# Patient Record
Sex: MALE | Race: WHITE | NOT HISPANIC OR LATINO | Employment: OTHER | URBAN - METROPOLITAN AREA
[De-identification: names, ages, dates, MRNs, and addresses within clinical notes are randomized per-mention and may not be internally consistent; named-entity substitution may affect disease eponyms.]

---

## 2020-07-22 ENCOUNTER — HOSPITAL ENCOUNTER (OUTPATIENT)
Dept: LAB | Facility: HOSPITAL | Age: 75
Discharge: HOME | End: 2020-07-22
Attending: PHYSICIAN ASSISTANT

## 2020-07-22 LAB
25(OH)D3 SERPL-MCNC: 46.1 NG/ML (ref 30–100)
ALBUMIN SERPL-MCNC: 4.1 G/DL (ref 3.5–5)
ALBUMIN/GLOB SERPL: 1.2 {RATIO} (ref 1.4–2.6)
ALP SERPL-CCNC: 74 U/L (ref 56–155)
ALT SERPL-CCNC: 15 U/L (ref 10–40)
ANION GAP SERPL CALC-SCNC: 21 MMOL/L (ref 8–19)
AST SERPL-CCNC: 17 U/L (ref 15–50)
BASOPHILS # BLD AUTO: 0.03 10*3/UL (ref 0–0.2)
BASOPHILS NFR BLD AUTO: 0.4 % (ref 0–3)
BILIRUB SERPL-MCNC: 0.48 MG/DL (ref 0.2–1.3)
BUN SERPL-MCNC: 19 MG/DL (ref 5–25)
BUN/CREAT SERPL: 20 {RATIO} (ref 6–20)
CALCIUM SERPL-MCNC: 9.7 MG/DL (ref 8.7–10.4)
CHLORIDE SERPL-SCNC: 103 MMOL/L (ref 99–111)
CHOLEST SERPL-MCNC: 186 MG/DL (ref 107–200)
CHOLEST/HDLC SERPL: 3.9 {RATIO} (ref 3–6)
CONV ABS IMM GRAN: 0.01 10*3/UL (ref 0–0.2)
CONV CO2: 23 MMOL/L (ref 22–32)
CONV IMMATURE GRAN: 0.1 % (ref 0–1.8)
CONV TOTAL PROTEIN: 7.5 G/DL (ref 6.3–8.2)
CREAT UR-MCNC: 0.97 MG/DL (ref 0.7–1.2)
DEPRECATED RDW RBC AUTO: 44.3 FL (ref 35.1–43.9)
EOSINOPHIL # BLD AUTO: 0.43 10*3/UL (ref 0–0.7)
EOSINOPHIL # BLD AUTO: 5.5 % (ref 0–7)
ERYTHROCYTE [DISTWIDTH] IN BLOOD BY AUTOMATED COUNT: 12.9 % (ref 11.6–14.4)
GFR SERPLBLD BASED ON 1.73 SQ M-ARVRAT: >60 ML/MIN/{1.73_M2}
GLOBULIN UR ELPH-MCNC: 3.4 G/DL (ref 2–3.5)
GLUCOSE SERPL-MCNC: 98 MG/DL (ref 70–99)
HCT VFR BLD AUTO: 50.3 % (ref 42–52)
HDLC SERPL-MCNC: 48 MG/DL (ref 40–60)
HGB BLD-MCNC: 16.6 G/DL (ref 14–18)
LDLC SERPL CALC-MCNC: 119 MG/DL (ref 70–100)
LYMPHOCYTES # BLD AUTO: 2.67 10*3/UL (ref 1–5)
LYMPHOCYTES NFR BLD AUTO: 34.3 % (ref 20–45)
MCH RBC QN AUTO: 30.9 PG (ref 27–31)
MCHC RBC AUTO-ENTMCNC: 33 G/DL (ref 33–37)
MCV RBC AUTO: 93.5 FL (ref 80–96)
MONOCYTES # BLD AUTO: 0.75 10*3/UL (ref 0.2–1.2)
MONOCYTES NFR BLD AUTO: 9.6 % (ref 3–10)
NEUTROPHILS # BLD AUTO: 3.9 10*3/UL (ref 2–8)
NEUTROPHILS NFR BLD AUTO: 50.1 % (ref 30–85)
NRBC CBCN: 0 % (ref 0–0.7)
OSMOLALITY SERPL CALC.SUM OF ELEC: 296 MOSM/KG (ref 273–304)
PLATELET # BLD AUTO: 201 10*3/UL (ref 130–400)
PMV BLD AUTO: 11.1 FL (ref 9.4–12.4)
POTASSIUM SERPL-SCNC: 4.7 MMOL/L (ref 3.5–5.3)
PSA SERPL-MCNC: 0.32 NG/ML (ref 0–4)
RBC # BLD AUTO: 5.38 10*6/UL (ref 4.7–6.1)
SODIUM SERPL-SCNC: 142 MMOL/L (ref 135–147)
T4 FREE SERPL-MCNC: 1.3 NG/DL (ref 0.9–1.8)
TRIGL SERPL-MCNC: 94 MG/DL (ref 40–150)
TSH SERPL-ACNC: 2.67 M[IU]/L (ref 0.27–4.2)
VLDLC SERPL-MCNC: 19 MG/DL (ref 5–37)
WBC # BLD AUTO: 7.79 10*3/UL (ref 4.8–10.8)

## 2023-08-10 ENCOUNTER — ANESTHESIA EVENT (OUTPATIENT)
Dept: PERIOP | Facility: HOSPITAL | Age: 78
DRG: 482 | End: 2023-08-10
Payer: MEDICARE

## 2023-08-10 ENCOUNTER — APPOINTMENT (OUTPATIENT)
Dept: GENERAL RADIOLOGY | Facility: HOSPITAL | Age: 78
DRG: 482 | End: 2023-08-10
Payer: MEDICARE

## 2023-08-10 ENCOUNTER — HOSPITAL ENCOUNTER (INPATIENT)
Facility: HOSPITAL | Age: 78
LOS: 3 days | Discharge: HOME HEALTH | DRG: 482 | End: 2023-08-13
Attending: EMERGENCY MEDICINE | Admitting: FAMILY MEDICINE
Payer: MEDICARE

## 2023-08-10 DIAGNOSIS — S72.144A CLOSED NONDISPLACED INTERTROCHANTERIC FRACTURE OF RIGHT FEMUR, INITIAL ENCOUNTER: Primary | ICD-10-CM

## 2023-08-10 DIAGNOSIS — Z74.09 IMPAIRED MOBILITY AND ADLS: ICD-10-CM

## 2023-08-10 DIAGNOSIS — Z78.9 IMPAIRED MOBILITY AND ADLS: ICD-10-CM

## 2023-08-10 DIAGNOSIS — S72.141A CLOSED 2-PART INTERTROCHANTERIC FRACTURE OF RIGHT FEMUR, INITIAL ENCOUNTER: ICD-10-CM

## 2023-08-10 DIAGNOSIS — M25.561 RIGHT KNEE PAIN, UNSPECIFIED CHRONICITY: ICD-10-CM

## 2023-08-10 DIAGNOSIS — S41.111A SKIN TEAR OF RIGHT UPPER ARM WITHOUT COMPLICATION, INITIAL ENCOUNTER: ICD-10-CM

## 2023-08-10 DIAGNOSIS — S72.001A CLOSED RIGHT HIP FRACTURE, INITIAL ENCOUNTER: ICD-10-CM

## 2023-08-10 DIAGNOSIS — R26.2 DIFFICULTY IN WALKING: ICD-10-CM

## 2023-08-10 LAB
ALBUMIN SERPL-MCNC: 4 G/DL (ref 3.5–5.2)
ALBUMIN/GLOB SERPL: 1.1 G/DL
ALP SERPL-CCNC: 81 U/L (ref 39–117)
ALT SERPL W P-5'-P-CCNC: 11 U/L (ref 1–41)
ANION GAP SERPL CALCULATED.3IONS-SCNC: 8.4 MMOL/L (ref 5–15)
APTT PPP: 29.1 SECONDS (ref 78–95.9)
AST SERPL-CCNC: 13 U/L (ref 1–40)
BASOPHILS # BLD AUTO: 0.03 10*3/MM3 (ref 0–0.2)
BASOPHILS NFR BLD AUTO: 0.4 % (ref 0–1.5)
BILIRUB SERPL-MCNC: 0.4 MG/DL (ref 0–1.2)
BUN SERPL-MCNC: 23 MG/DL (ref 8–23)
BUN/CREAT SERPL: 23 (ref 7–25)
CALCIUM SPEC-SCNC: 9.2 MG/DL (ref 8.6–10.5)
CHLORIDE SERPL-SCNC: 103 MMOL/L (ref 98–107)
CO2 SERPL-SCNC: 27.6 MMOL/L (ref 22–29)
CREAT SERPL-MCNC: 1 MG/DL (ref 0.76–1.27)
DEPRECATED RDW RBC AUTO: 41.7 FL (ref 37–54)
EGFRCR SERPLBLD CKD-EPI 2021: 77.5 ML/MIN/1.73
EOSINOPHIL # BLD AUTO: 0.25 10*3/MM3 (ref 0–0.4)
EOSINOPHIL NFR BLD AUTO: 2.9 % (ref 0.3–6.2)
ERYTHROCYTE [DISTWIDTH] IN BLOOD BY AUTOMATED COUNT: 12.5 % (ref 12.3–15.4)
GLOBULIN UR ELPH-MCNC: 3.5 GM/DL
GLUCOSE SERPL-MCNC: 87 MG/DL (ref 65–99)
HCT VFR BLD AUTO: 46 % (ref 37.5–51)
HGB BLD-MCNC: 15.7 G/DL (ref 13–17.7)
IMM GRANULOCYTES # BLD AUTO: 0.03 10*3/MM3 (ref 0–0.05)
IMM GRANULOCYTES NFR BLD AUTO: 0.4 % (ref 0–0.5)
LIPASE SERPL-CCNC: 26 U/L (ref 13–60)
LYMPHOCYTES # BLD AUTO: 2.19 10*3/MM3 (ref 0.7–3.1)
LYMPHOCYTES NFR BLD AUTO: 25.8 % (ref 19.6–45.3)
MCH RBC QN AUTO: 31.2 PG (ref 26.6–33)
MCHC RBC AUTO-ENTMCNC: 34.1 G/DL (ref 31.5–35.7)
MCV RBC AUTO: 91.3 FL (ref 79–97)
MONOCYTES # BLD AUTO: 0.78 10*3/MM3 (ref 0.1–0.9)
MONOCYTES NFR BLD AUTO: 9.2 % (ref 5–12)
NEUTROPHILS NFR BLD AUTO: 5.22 10*3/MM3 (ref 1.7–7)
NEUTROPHILS NFR BLD AUTO: 61.3 % (ref 42.7–76)
NRBC BLD AUTO-RTO: 0 /100 WBC (ref 0–0.2)
PLATELET # BLD AUTO: 223 10*3/MM3 (ref 140–450)
PMV BLD AUTO: 9.7 FL (ref 6–12)
POTASSIUM SERPL-SCNC: 4.2 MMOL/L (ref 3.5–5.2)
PROT SERPL-MCNC: 7.5 G/DL (ref 6–8.5)
QT INTERVAL: 401 MS
RBC # BLD AUTO: 5.04 10*6/MM3 (ref 4.14–5.8)
SODIUM SERPL-SCNC: 139 MMOL/L (ref 136–145)
WBC NRBC COR # BLD: 8.5 10*3/MM3 (ref 3.4–10.8)

## 2023-08-10 PROCEDURE — 99222 1ST HOSP IP/OBS MODERATE 55: CPT | Performed by: ORTHOPAEDIC SURGERY

## 2023-08-10 PROCEDURE — 71045 X-RAY EXAM CHEST 1 VIEW: CPT

## 2023-08-10 PROCEDURE — 85025 COMPLETE CBC W/AUTO DIFF WBC: CPT | Performed by: EMERGENCY MEDICINE

## 2023-08-10 PROCEDURE — 99222 1ST HOSP IP/OBS MODERATE 55: CPT | Performed by: FAMILY MEDICINE

## 2023-08-10 PROCEDURE — 73502 X-RAY EXAM HIP UNI 2-3 VIEWS: CPT

## 2023-08-10 PROCEDURE — 83690 ASSAY OF LIPASE: CPT | Performed by: EMERGENCY MEDICINE

## 2023-08-10 PROCEDURE — 80053 COMPREHEN METABOLIC PANEL: CPT | Performed by: EMERGENCY MEDICINE

## 2023-08-10 PROCEDURE — 93005 ELECTROCARDIOGRAM TRACING: CPT | Performed by: EMERGENCY MEDICINE

## 2023-08-10 PROCEDURE — 99285 EMERGENCY DEPT VISIT HI MDM: CPT

## 2023-08-10 PROCEDURE — 85730 THROMBOPLASTIN TIME PARTIAL: CPT | Performed by: EMERGENCY MEDICINE

## 2023-08-10 RX ORDER — BISACODYL 10 MG
10 SUPPOSITORY, RECTAL RECTAL DAILY PRN
Status: DISCONTINUED | OUTPATIENT
Start: 2023-08-10 | End: 2023-08-13 | Stop reason: HOSPADM

## 2023-08-10 RX ORDER — HYDROCODONE BITARTRATE AND ACETAMINOPHEN 5; 325 MG/1; MG/1
1 TABLET ORAL EVERY 6 HOURS PRN
Status: DISCONTINUED | OUTPATIENT
Start: 2023-08-10 | End: 2023-08-13 | Stop reason: HOSPADM

## 2023-08-10 RX ORDER — POLYETHYLENE GLYCOL 3350 17 G/17G
17 POWDER, FOR SOLUTION ORAL DAILY PRN
Status: DISCONTINUED | OUTPATIENT
Start: 2023-08-10 | End: 2023-08-13 | Stop reason: HOSPADM

## 2023-08-10 RX ORDER — CEFAZOLIN SODIUM 2 G/100ML
2000 INJECTION, SOLUTION INTRAVENOUS
Status: DISCONTINUED | OUTPATIENT
Start: 2023-08-11 | End: 2023-08-11 | Stop reason: HOSPADM

## 2023-08-10 RX ORDER — ACETAMINOPHEN 325 MG/1
650 TABLET ORAL EVERY 4 HOURS PRN
Status: DISCONTINUED | OUTPATIENT
Start: 2023-08-10 | End: 2023-08-13 | Stop reason: HOSPADM

## 2023-08-10 RX ORDER — SODIUM CHLORIDE 0.9 % (FLUSH) 0.9 %
10 SYRINGE (ML) INJECTION AS NEEDED
Status: DISCONTINUED | OUTPATIENT
Start: 2023-08-10 | End: 2023-08-13 | Stop reason: HOSPADM

## 2023-08-10 RX ORDER — BISACODYL 5 MG/1
5 TABLET, DELAYED RELEASE ORAL DAILY PRN
Status: DISCONTINUED | OUTPATIENT
Start: 2023-08-10 | End: 2023-08-13 | Stop reason: HOSPADM

## 2023-08-10 RX ORDER — SODIUM CHLORIDE 0.9 % (FLUSH) 0.9 %
10 SYRINGE (ML) INJECTION EVERY 12 HOURS SCHEDULED
Status: DISCONTINUED | OUTPATIENT
Start: 2023-08-10 | End: 2023-08-13 | Stop reason: HOSPADM

## 2023-08-10 RX ORDER — SODIUM CHLORIDE 9 MG/ML
40 INJECTION, SOLUTION INTRAVENOUS AS NEEDED
Status: DISCONTINUED | OUTPATIENT
Start: 2023-08-10 | End: 2023-08-13 | Stop reason: HOSPADM

## 2023-08-10 RX ORDER — MORPHINE SULFATE 2 MG/ML
2 INJECTION, SOLUTION INTRAMUSCULAR; INTRAVENOUS EVERY 6 HOURS PRN
Status: DISCONTINUED | OUTPATIENT
Start: 2023-08-10 | End: 2023-08-11

## 2023-08-10 RX ORDER — AMOXICILLIN 250 MG
2 CAPSULE ORAL 2 TIMES DAILY
Status: DISCONTINUED | OUTPATIENT
Start: 2023-08-10 | End: 2023-08-13 | Stop reason: HOSPADM

## 2023-08-10 RX ORDER — HYDROCODONE BITARTRATE AND ACETAMINOPHEN 10; 325 MG/1; MG/1
1 TABLET ORAL EVERY 6 HOURS PRN
Status: DISCONTINUED | OUTPATIENT
Start: 2023-08-10 | End: 2023-08-11

## 2023-08-10 RX ORDER — ONDANSETRON 2 MG/ML
4 INJECTION INTRAMUSCULAR; INTRAVENOUS EVERY 6 HOURS PRN
Status: DISCONTINUED | OUTPATIENT
Start: 2023-08-10 | End: 2023-08-13 | Stop reason: HOSPADM

## 2023-08-10 RX ORDER — ALUMINA, MAGNESIA, AND SIMETHICONE 2400; 2400; 240 MG/30ML; MG/30ML; MG/30ML
15 SUSPENSION ORAL EVERY 6 HOURS PRN
Status: DISCONTINUED | OUTPATIENT
Start: 2023-08-10 | End: 2023-08-13 | Stop reason: HOSPADM

## 2023-08-10 RX ADMIN — Medication 10 ML: at 20:46

## 2023-08-10 RX ADMIN — SENNOSIDES AND DOCUSATE SODIUM 2 TABLET: 50; 8.6 TABLET ORAL at 20:47

## 2023-08-10 NOTE — H&P
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  He fell off his tractor approximately 1 week ago.  Initially he was able to stand up and ambulate with some distal femur pain after the fall.  He had no loss of consciousness.  Mechanism of fall was mechanical, he tripped over a 4 x 4 next to his tractor, landing on his right side.  Pain progressively worsened, acute worsening focalized pain in his right lateral hip area, enough to provoke him to visit the emergency room for further evaluation.  In the emergency room he had imaging of his right pelvis which revealed acute nondisplaced intertrochanteric fracture of the proximal right femur.  Given this finding, orthopedics was consulted, hospitalist service was requested to admit and further manage.  The patient's only recalled surgery is left eye surgery.  He takes no medications, he lives an active lifestyle, he has no shortness of breath with exertional activity.  He is an active farmer, his chest x-ray showed no significant findings other than pleural calcifications and bibasilar scarring and/or atelectasis.  His EKG showed no acute ischemic changes.  His labs are essentially unremarkable.      Personal History     Past Medical History:  History reviewed. No pertinent past medical history.      Past Surgical History:  History reviewed. No pertinent surgical history.      Family History:   History reviewed. No pertinent family history.  No family history of HTN, Bleeding disorders, Strokes or heart disease    Social History:   Social History      Socioeconomic History    Marital status: Single   Tobacco Use    Smoking status: Every Day     Packs/day: 1.00     Years: 70.00     Pack years: 70.00     Types: Cigarettes    Smokeless tobacco: Current   Vaping Use    Vaping Use: Never used   Substance and Sexual Activity    Alcohol use: Yes     Comment: occasionally    Drug use: Never    Sexual activity: Defer         Home Medications:       Allergies:  No Known Allergies    Review of systems:  All systems reviewed and negative except for right hip.      Objective   Objective     Vitals:   Temp:  [98.2 øF (36.8 øC)] 98.2 øF (36.8 øC)  Heart Rate:  [92] 92  Resp:  [18] 18  BP: (130)/(88) 130/88    Physical Exam    Constitutional: Awake, alert, no acute distress   Eyes: Pupils equal, sclerae anicteric, no conjunctival injection   HENT: NCAT, mucous membranes moist   Neck: Supple, no thyromegaly, no lymphadenopathy, trachea midline   Respiratory: Diminished to auscultation bilaterally, nonlabored respirations    Cardiovascular: RRR, no rubs, or gallops, palpable pedal pulses bilaterally   Gastrointestinal: Positive bowel sounds, soft, nontender, nondistended   Musculoskeletal: No bilateral ankle edema, no clubbing or cyanosis to extremities, tenderness to the right hip area   Psychiatric: Appropriate affect, cooperative   Neurologic: Oriented x 3, strength symmetric in all extremities, not tested right leg, distal right extremity neurovascular intact, Cranial Nerves grossly intact to confrontation, speech clear   Skin: No rashes visible on exposed skin      Result Review    Result Review:  I have personally reviewed the results from the time of this admission to 8/10/2023 16:28 EDT and agree with these findings:  [x]  Laboratory LAB RESULTS:      Lab 08/10/23  1427   WBC 8.50   HEMOGLOBIN 15.7   HEMATOCRIT 46.0   PLATELETS 223   NEUTROS ABS 5.22   IMMATURE GRANS (ABS) 0.03   LYMPHS ABS 2.19   MONOS ABS 0.78   EOS ABS 0.25   MCV 91.3   APTT 29.1*         Lab  08/10/23  1427   SODIUM 139   POTASSIUM 4.2   CHLORIDE 103   CO2 27.6   ANION GAP 8.4   BUN 23   CREATININE 1.00   EGFR 77.5   GLUCOSE 87   CALCIUM 9.2         Lab 08/10/23  1427   TOTAL PROTEIN 7.5   ALBUMIN 4.0   GLOBULIN 3.5   ALT (SGPT) 11   AST (SGOT) 13   BILIRUBIN 0.4   ALK PHOS 81   LIPASE 26                     Brief Urine Lab Results       None          Microbiology Results (last 10 days)       ** No results found for the last 240 hours. **            []  Microbiology  [x]  Radiology XR Chest 1 View    Result Date: 8/10/2023  PROCEDURE: XR CHEST 1 VW  COMPARISON: [facility], CR, CHEST PA/AP & LAT 2V, 8/03/2016, 19:05.  INDICATIONS: PRE-OP FOR HIP FRACTURE TODAY - NO CHEST COMPLAINTS  FINDINGS:  Heart size and pulmonary vessels normal.  Pleural calcifications noted on the left.  Scarring or chronic atelectasis in the lung bases.  No acute pulmonary abnormality demonstrated         1. No acute cardiopulmonary disease  2. Pleural calcifications and bibasilar scarring or atelectasis       SPEEDY LOBO MD       Electronically Signed and Approved By: SPEEDY LOBO MD on 8/10/2023 at 14:33             XR Hip With or Without Pelvis 2 - 3 View Right    Result Date: 8/10/2023  PROCEDURE: XR HIP W OR WO PELVIS 2-3 VIEW RIGHT  COMPARISON: None  INDICATIONS: fall/injury with right hip pain and inability to bear weight on right leg  FINDINGS:  There is mild narrowing of the hip joint spaces bilaterally.  There is an acute intertrochanteric nondisplaced fracture of the right proximal femur.  Sacroiliac joints appear within normal limits.  No definite pelvic fracture.        1. Acute nondisplaced intertrochanteric fracture of the proximal right femur.      MEDHAT SCOTT MD       Electronically Signed and Approved By: MEDHAT SCOTT MD on 8/10/2023 at 13:29              [x]  EKG/Telemetry   []  Cardiology/Vascular   []  Pathology  [x]  Old records  []  Other:      Assessment & Plan   Assessment / Plan      Assessment/Plan:   Assessment:  Acute nondisplaced intertrochanteric fracture of the proximal right femur  Current tobacco smoker  Bibasilar scarring, pleural calcifications    Plan:  Labs and imaging reviewed  Admit to hospitalist service  Regular diet, n.p.o. after midnight for anticipated orthopedic surgery intervention  Dr. Colindres consulted, follow-up recommendations  Chest x-ray and EKG reviewed  Will start anticoagulation after surgery for DVT prophylaxis  Pain control with Norco 5/325 and 10/325 mg every 6 hours as needed for moderate-severe pain respectively  Morphine every 6 hours as needed 2 mg for severe breakthrough pain  Bowel regimen  PT/OT after surgery  A.m. labs  Full code  DVT prophylaxis with SCDs  Clinical course dictate further management  Discussed with nurse at the bedside  Discussed with the ER midlevel provider who requested hospitalization      DVT prophylaxis:  No DVT prophylaxis order currently exists.    CODE STATUS:    Level Of Support Discussed With: Patient  Code Status (Patient has no pulse and is not breathing): CPR (Attempt to Resuscitate)  Medical Interventions (Patient has pulse or is breathing): Full Support      Admission Status:  I believe this patient meets inpatient status.    Electronically signed by Mariam Cartagena MD, 08/10/23, 4:28 PM EDT.    Portions of this documentation were transcribed electronically from a voice recognition software.  I confirm all data accurately represents the service(s) I performed at today's visit.

## 2023-08-10 NOTE — ED PROVIDER NOTES
Emergency Department Encounter    Date seen: 8/10/2023  Time: 2:26 PM EDT    Room number: 225/1    Chief Complaint: hip pain     HPI    History of Present Illness:  Patient is a 78 y.o. year old male who presents to the emergency department for evaluation of right hip and upper leg pain.  Patient reports he was stepping off of his tractor 1 week and 1 day ago when he stepped on a 2 x 4 that he was using as a chalk behind his tire.  The 2 x 4 is reported to have slipped out from underneath him causing him to fall landing on his right side. Pt fell on concrete.  He has been using a walker to help ambulate since then, which he normally does not have to do. He states that last night he went to stand up and had an sudden sharp pain in right hip and upper leg. Since then he has not been able to bear weight to that extremity. He also sustained a skin tear to his right upper arm at time of fall. He denies striking his head or any LOC.     Independent Historian/Clinical History and Information was obtained by:   Patient    History is limited by: N/A      PCP: Provider, No Known        Past Medical History:     No Known Allergies  History reviewed. No pertinent past medical history.  History reviewed. No pertinent surgical history.  History reviewed. No pertinent family history.    Home Medications:  Prior to Admission medications    Medication Sig Start Date End Date Taking? Authorizing Provider   naproxen sodium (ALEVE) 220 MG tablet Take 220 mg by mouth 2 (Two) Times a Day As Needed.    Emergency, Nurse Vikki, RN        Social History:   Social History     Tobacco Use    Smoking status: Every Day     Packs/day: 1.00     Years: 70.00     Pack years: 70.00     Types: Cigarettes    Smokeless tobacco: Current   Vaping Use    Vaping Use: Never used   Substance Use Topics    Alcohol use: Yes     Comment: occasionally    Drug use: Never       All labs were reviewed and interpreted by me.  All X-rays impressions were independently  "interpreted by me.  EKG was interpreted by me.      Review of Systems:  Review of Systems   Constitutional:  Negative for chills and fever.   HENT:  Negative for congestion, ear pain and sore throat.    Eyes:  Negative for pain.   Respiratory:  Negative for cough, chest tightness and shortness of breath.    Cardiovascular:  Negative for chest pain.   Gastrointestinal:  Negative for abdominal pain, diarrhea, nausea and vomiting.   Genitourinary:  Negative for flank pain and hematuria.   Musculoskeletal:  Positive for arthralgias (Right hip and hamstring area pain). Negative for joint swelling.   Skin:  Positive for wound (Right upper arm). Negative for pallor.   Neurological:  Negative for seizures and headaches.   All other systems reviewed and are negative.     Physical Exam:  /88 (BP Location: Left arm, Patient Position: Sitting)   Pulse 92   Temp 98.2 øF (36.8 øC) (Oral)   Resp 18   Ht 190.5 cm (75\")   Wt 88 kg (194 lb)   SpO2 96%   BMI 24.25 kg/mý     Physical Exam  Vitals and nursing note reviewed.   Constitutional:       General: He is not in acute distress.     Appearance: Normal appearance. He is not ill-appearing or toxic-appearing.   HENT:      Head: Normocephalic and atraumatic.      Mouth/Throat:      Mouth: Mucous membranes are moist.   Eyes:      General: No scleral icterus.  Cardiovascular:      Rate and Rhythm: Normal rate and regular rhythm.      Pulses: Normal pulses.      Heart sounds: Normal heart sounds.   Pulmonary:      Effort: Pulmonary effort is normal. No respiratory distress.      Breath sounds: Normal breath sounds.   Abdominal:      General: Abdomen is flat. There is no distension.      Palpations: Abdomen is soft.      Tenderness: There is no abdominal tenderness.   Musculoskeletal:         General: Tenderness and signs of injury present. No swelling or deformity. Normal range of motion.      Cervical back: Normal range of motion and neck supple.      Comments: There is no " internal or external rotation appreciated in legs, lower extremities, are of equal length there is no shortening on exam   Skin:     General: Skin is warm and dry.      Capillary Refill: Capillary refill takes less than 2 seconds.   Neurological:      Mental Status: He is alert and oriented to person, place, and time. Mental status is at baseline.      Sensory: No sensory deficit.                Procedures:  Procedures      Medical Decision Making:      Comorbidities that affect care:    Smoking    External Notes reviewed:    Previous Clinic Note: Clinic note reviewed from 9/3/2021 where patient was seen at a local urgent care center for a corneal abrasion      The following orders were placed and all results were independently analyzed by me:  Orders Placed This Encounter   Procedures    XR Hip With or Without Pelvis 2 - 3 View Right    XR Chest 1 View    Comprehensive Metabolic Panel    aPTT    Lipase    CBC Auto Differential    NPO Diet NPO Type: Strict NPO    Code Status and Medical Interventions:    IP General Consult (Use specialty-specific consult if known)    IP General Consult (Use specialty-specific consult if known)    ECG 12 Lead Pre-Op / Pre-Procedure    Insert Peripheral IV    Inpatient Admission    CBC & Differential       Medications Given in the Emergency Department:  Medications   sodium chloride 0.9 % flush 10 mL (has no administration in time range)        ED Course:    ED Course as of 08/10/23 1634   Thu Aug 10, 2023   1515 Dr. Colindres consulted. He would like pt to be admitted to hospital and made NPO after midnight.  [MS]   1534 Order placed for hospitalist consult at this time for admission to hospital  [MS]   1539 Spoke with Dr. Bronson and pt is to be admitted to Hospital Via Dr. Cartagena [MS]   1545 Spoke with Dr. Cartagena who will be admitting pt [MS]   1629 EKG shows a sinus rhythm with no ST elevation or other indications of lethal or fatal dysrhythmias.   [MS]      ED Course User  Index  [MS] Kaitlin Contreras, APRN       Labs:    Lab Results (last 24 hours)       Procedure Component Value Units Date/Time    Comprehensive Metabolic Panel [[id number]] Collected: 08/10/23 1427    Specimen: Blood Updated: 08/10/23 1507     Glucose 87 mg/dL      BUN 23 mg/dL      Creatinine 1.00 mg/dL      Sodium 139 mmol/L      Potassium 4.2 mmol/L      Chloride 103 mmol/L      CO2 27.6 mmol/L      Calcium 9.2 mg/dL      Total Protein 7.5 g/dL      Albumin 4.0 g/dL      ALT (SGPT) 11 U/L      AST (SGOT) 13 U/L      Alkaline Phosphatase 81 U/L      Total Bilirubin 0.4 mg/dL      Globulin 3.5 gm/dL      A/G Ratio 1.1 g/dL      BUN/Creatinine Ratio 23.0     Anion Gap 8.4 mmol/L      eGFR 77.5 mL/min/1.73     Narrative:      GFR Normal >60  Chronic Kidney Disease <60  Kidney Failure <15    The GFR formula is only valid for adults with stable renal function between ages 18 and 70.    CBC & Differential [[id number]]  (Normal) Collected: 08/10/23 1427    Specimen: Blood Updated: 08/10/23 1444    Narrative:      The following orders were created for panel order CBC & Differential.  Procedure                               Abnormality         Status                     ---------                               -----------         ------                     CBC Auto Differential[[id number]]        Normal              Final result                 Please view results for these tests on the individual orders.    aPTT [[id number]]  (Abnormal) Collected: 08/10/23 1427    Specimen: Blood Updated: 08/10/23 1458     PTT 29.1 seconds     Lipase [[id number]]  (Normal) Collected: 08/10/23 1427    Specimen: Blood Updated: 08/10/23 1507     Lipase 26 U/L     CBC Auto Differential [[id number]]  (Normal) Collected: 08/10/23 1427    Specimen: Blood Updated: 08/10/23 1444     WBC 8.50 10*3/mm3      RBC 5.04 10*6/mm3      Hemoglobin 15.7 g/dL      Hematocrit 46.0 %      MCV 91.3 fL      MCH 31.2 pg      MCHC 34.1 g/dL      RDW 12.5 %       RDW-SD 41.7 fl      MPV 9.7 fL      Platelets 223 10*3/mm3      Neutrophil % 61.3 %      Lymphocyte % 25.8 %      Monocyte % 9.2 %      Eosinophil % 2.9 %      Basophil % 0.4 %      Immature Grans % 0.4 %      Neutrophils, Absolute 5.22 10*3/mm3      Lymphocytes, Absolute 2.19 10*3/mm3      Monocytes, Absolute 0.78 10*3/mm3      Eosinophils, Absolute 0.25 10*3/mm3      Basophils, Absolute 0.03 10*3/mm3      Immature Grans, Absolute 0.03 10*3/mm3      nRBC 0.0 /100 WBC              Imaging:    XR Chest 1 View    Result Date: 8/10/2023  PROCEDURE: XR CHEST 1 VW  COMPARISON: [facility], , CHEST PA/AP & LAT 2V, 8/03/2016, 19:05.  INDICATIONS: PRE-OP FOR HIP FRACTURE TODAY - NO CHEST COMPLAINTS  FINDINGS:  Heart size and pulmonary vessels normal.  Pleural calcifications noted on the left.  Scarring or chronic atelectasis in the lung bases.  No acute pulmonary abnormality demonstrated         1. No acute cardiopulmonary disease  2. Pleural calcifications and bibasilar scarring or atelectasis       SPEEDY LOBO MD       Electronically Signed and Approved By: SPEEDY LOBO MD on 8/10/2023 at 14:33             XR Hip With or Without Pelvis 2 - 3 View Right    Result Date: 8/10/2023  PROCEDURE: XR HIP W OR WO PELVIS 2-3 VIEW RIGHT  COMPARISON: None  INDICATIONS: fall/injury with right hip pain and inability to bear weight on right leg  FINDINGS:  There is mild narrowing of the hip joint spaces bilaterally.  There is an acute intertrochanteric nondisplaced fracture of the right proximal femur.  Sacroiliac joints appear within normal limits.  No definite pelvic fracture.        1. Acute nondisplaced intertrochanteric fracture of the proximal right femur.      MEDHAT SCOTT MD       Electronically Signed and Approved By: MEDHAT SCOTT MD on 8/10/2023 at 13:29                Differential Diagnosis and Discussion:    Extremity Pain: Differential diagnosis includes but is not limited to soft tissue sprain,  tendonitis, tendon injury, dislocation, fracture, deep vein thrombosis, arterial insufficiency, osteoarthritis, bursitis, and ligamentous damage.        Patient Care Considerations:    CT EXTREMITY: I considered ordering an extremity CT, however fracture was obvious on x-ray and no additional imaging are required at this time.      Consultants/Shared Management Plan:    Hospitalist: I have discussed the case with Dr. Cartagena who agrees to accept the patient for admission.  Consultant: I have discussed the case with on-call orthopedic surgeon, Dr. Colindres, who states he will consult on patient once admitted to the hospital and taken to the operating room tomorrow.    Social Determinants of Health:    Patient is independent, reliable, and has access to care.       Disposition and Care Coordination:    Admit:   Through independent evaluation of the patient's history, physical, and imperical data, the patient meets criteria for observation/admission to the hospital.    MDM  Number of Diagnoses or Management Options  Closed nondisplaced intertrochanteric fracture of right femur, initial encounter: new and requires workup  Right knee pain, unspecified chronicity: new and does not require workup  Skin tear of right upper arm without complication, initial encounter: new and does not require workup     Amount and/or Complexity of Data Reviewed  Clinical lab tests: reviewed and ordered  Tests in the radiology section of [facility]: reviewed and ordered  Tests in the medicine section of [facility]: reviewed  Review and summarize past medical records: yes (I have personally reviewed patient's previous medical encounters.)  Discuss the patient with other providers: yes (I discussed this patient with Dr. Colindres who will take patient to the OR tomorrow.  I then spoke with on-call hospitalist Dr. Bronson as well as Dr. Cartagena.  Patient will be admitted to the hospital by Dr. Cartagena)  Independent visualization of images, tracings,  or specimens: yes (EKG reviewed)    Risk of Complications, Morbidity, and/or Mortality  Presenting problems: high  Diagnostic procedures: moderate  Management options: high    Patient Progress  Patient progress: stable       Final diagnoses:   Closed nondisplaced intertrochanteric fracture of right femur, initial encounter   Right knee pain, unspecified chronicity   Skin tear of right upper arm without complication, initial encounter        ED Disposition       ED Disposition   Decision to Admit    Condition   --    Comment   Level of Care: Med/Surg [1]   Diagnosis: Closed right hip fracture, initial encounter [194956]   Admitting Physician: MELVIN CORDOBA [[id number]]   Attending Physician: MELVIN CORDOBA [[id number]]   Isolate for COVID?: No [0]   Certification: I Certify That Inpatient Hospital Services Are Medically Necessary For Greater Than 2 Midnights                 This medical record created using voice recognition software.                       Kaitlin Contreras, APRN  08/10/23 9520

## 2023-08-10 NOTE — PAYOR COMM NOTE
"PATIENT INFORMATION  Name:  Patricia Stovall  MRN#:     [id number]  :  1945         ADMISSION INFORMATION  CLASS: Inpatient   DOS:  8/10/2023        CURRENT ATTENDING PROVIDER INFORMATION  Name/NPI: Mariam Cartagena MD [[id number]]  Phone:  Phone: [phone]        RENDERING FACILITY  Name:  [facility]   NPI:  [id number]  TID:  [id number]  Address:      [address] [place] [address]  Phone  [phone]        CASE MANAGEMENT CONTACT INFORMATION  Phone:      [phone]  Fax:           [phone]          Patricia Stovall (78 y.o. Male)       Date of Birth   1945    Social Security Number       Address   [address] [place] [id number]    Home Phone   [phone]    MRN   [id number]       Nondenominational   Unknown    Marital Status   Single                            Admission Date   8/10/23    Admission Type   Emergency    Admitting Provider   Mariam Cartagena MD    Attending Provider   Mariam Cartagena MD    Department, Room/Bed   [address] TOTAL JOINT [place],        Discharge Date       Discharge Disposition       Discharge Destination                                 Attending Provider: Mariam Cartagena MD    Allergies: No Known Allergies    Isolation: None   Infection: None   Code Status: CPR    Ht: 190.5 cm (75\")   Wt: 88 kg (194 lb)    Admission Cmt: None   Principal Problem: Closed 2-part intertrochanteric fracture of proximal end of right femur [S72.141A]                   Active Insurance as of 8/10/2023       Primary Coverage       Payor Plan Insurance Group Employer/Plan Group    HUMANA MEDICARE REPLACEMENT HUMANA MEDICARE REPLACEMENT [id number]       Payor Plan Address Payor Plan Phone Number Payor Plan Fax Number Effective Dates    [address] [address] [phone]  2021 - None Entered    [facility] [id number]         Subscriber Name Subscriber Birth Date Member ID       PATRICIA STOVALL 1945 [id number]                     "   History & Physical        Mariam Cartagena MD at 08/10/23 1627           [facility]   HOSPITALIST HISTORY AND PHYSICAL  Date: 8/10/2023   Patient Name: Anshul Meyer  : 1945  MRN: [id number]  Primary Care Physician:  Provider, No Known  Date of admission: 8/10/2023    Subjective   Subjective     Chief complaint: Right hip pain    History of presenting illness:  78-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  He fell off his tractor approximately 1 week ago.  Initially he was able to stand up and ambulate with some distal femur pain after the fall.  He had no loss of consciousness.  Mechanism of fall was mechanical, he tripped over a 4 x 4 next to his tractor, landing on his right side.  Pain progressively worsened, acute worsening focalized pain in his right lateral hip area, enough to provoke him to visit the emergency room for further evaluation.  In the emergency room he had imaging of his right pelvis which revealed acute nondisplaced intertrochanteric fracture of the proximal right femur.  Given this finding, orthopedics was consulted, hospitalist service was requested to admit and further manage.  The patient's only recalled surgery is left eye surgery.  He takes no medications, he lives an active lifestyle, he has no shortness of breath with exertional activity.  He is an active farmer, his chest x-ray showed no significant findings other than pleural calcifications and bibasilar scarring and/or atelectasis.  His EKG showed no acute ischemic changes.  His labs are essentially unremarkable.      Personal History     Past Medical History:  History reviewed. No pertinent past medical history.      Past Surgical History:  History reviewed. No pertinent surgical history.      Family History:   History reviewed. No pertinent family history.  No family history of HTN, Bleeding disorders,  Strokes or heart disease    Social History:   Social History     Socioeconomic History    Marital status: Single   Tobacco Use    Smoking status: Every Day     Packs/day: 1.00     Years: 70.00     Pack years: 70.00     Types: Cigarettes    Smokeless tobacco: Current   Vaping Use    Vaping Use: Never used   Substance and Sexual Activity    Alcohol use: Yes     Comment: occasionally    Drug use: Never    Sexual activity: Defer         Home Medications:       Allergies:  No Known Allergies    Review of systems:  All systems reviewed and negative except for right hip.      Objective   Objective     Vitals:   Temp:  [98.2 øF (36.8 øC)] 98.2 øF (36.8 øC)  Heart Rate:  [92] 92  Resp:  [18] 18  BP: (130)/(88) 130/88    Physical Exam    Constitutional: Awake, alert, no acute distress   Eyes: Pupils equal, sclerae anicteric, no conjunctival injection   HENT: NCAT, mucous membranes moist   Neck: Supple, no thyromegaly, no lymphadenopathy, trachea midline   Respiratory: Diminished to auscultation bilaterally, nonlabored respirations    Cardiovascular: RRR, no rubs, or gallops, palpable pedal pulses bilaterally   Gastrointestinal: Positive bowel sounds, soft, nontender, nondistended   Musculoskeletal: No bilateral ankle edema, no clubbing or cyanosis to extremities, tenderness to the right hip area   Psychiatric: Appropriate affect, cooperative   Neurologic: Oriented x 3, strength symmetric in all extremities, not tested right leg, distal right extremity neurovascular intact, Cranial Nerves grossly intact to confrontation, speech clear   Skin: No rashes visible on exposed skin      Result Review    Result Review:  I have personally reviewed the results from the time of this admission to 8/10/2023 16:28 EDT and agree with these findings:  [x]  Laboratory LAB RESULTS:      Lab 08/10/23  1427   WBC 8.50   HEMOGLOBIN 15.7   HEMATOCRIT 46.0   PLATELETS 223   NEUTROS ABS 5.22   IMMATURE GRANS (ABS) 0.03   LYMPHS ABS 2.19   MONOS ABS  0.78   EOS ABS 0.25   MCV 91.3   APTT 29.1*         Lab 08/10/23  1427   SODIUM 139   POTASSIUM 4.2   CHLORIDE 103   CO2 27.6   ANION GAP 8.4   BUN 23   CREATININE 1.00   EGFR 77.5   GLUCOSE 87   CALCIUM 9.2         Lab 08/10/23  1427   TOTAL PROTEIN 7.5   ALBUMIN 4.0   GLOBULIN 3.5   ALT (SGPT) 11   AST (SGOT) 13   BILIRUBIN 0.4   ALK PHOS 81   LIPASE 26                     Brief Urine Lab Results       None          Microbiology Results (last 10 days)       ** No results found for the last 240 hours. **            []  Microbiology  [x]  Radiology XR Chest 1 View    Result Date: 8/10/2023  PROCEDURE: XR CHEST 1 VW  COMPARISON: [facility], , CHEST PA/AP & LAT 2V, 8/03/2016, 19:05.  INDICATIONS: PRE-OP FOR HIP FRACTURE TODAY - NO CHEST COMPLAINTS  FINDINGS:  Heart size and pulmonary vessels normal.  Pleural calcifications noted on the left.  Scarring or chronic atelectasis in the lung bases.  No acute pulmonary abnormality demonstrated         1. No acute cardiopulmonary disease  2. Pleural calcifications and bibasilar scarring or atelectasis       SPEEDY LOBO MD       Electronically Signed and Approved By: SPEEDY LOBO MD on 8/10/2023 at 14:33             XR Hip With or Without Pelvis 2 - 3 View Right    Result Date: 8/10/2023  PROCEDURE: XR HIP W OR WO PELVIS 2-3 VIEW RIGHT  COMPARISON: None  INDICATIONS: fall/injury with right hip pain and inability to bear weight on right leg  FINDINGS:  There is mild narrowing of the hip joint spaces bilaterally.  There is an acute intertrochanteric nondisplaced fracture of the right proximal femur.  Sacroiliac joints appear within normal limits.  No definite pelvic fracture.        1. Acute nondisplaced intertrochanteric fracture of the proximal right femur.      MEDHAT SCOTT MD       Electronically Signed and Approved By: MEDHAT SCOTT MD on 8/10/2023 at 13:29              [x]  EKG/Telemetry   []  Cardiology/Vascular   []  Pathology  [x]  Old  records  []  Other:      Assessment & Plan   Assessment / Plan     Assessment/Plan:   Assessment:  Acute nondisplaced intertrochanteric fracture of the proximal right femur  Current tobacco smoker  Bibasilar scarring, pleural calcifications    Plan:  Labs and imaging reviewed  Admit to hospitalist service  Regular diet, n.p.o. after midnight for anticipated orthopedic surgery intervention  Dr. Colindres consulted, follow-up recommendations  Chest x-ray and EKG reviewed  Will start anticoagulation after surgery for DVT prophylaxis  Pain control with Norco 5/325 and 10/325 mg every 6 hours as needed for moderate-severe pain respectively  Morphine every 6 hours as needed 2 mg for severe breakthrough pain  Bowel regimen  PT/OT after surgery  A.m. labs  Full code  DVT prophylaxis with SCDs  Clinical course dictate further management  Discussed with nurse at the bedside  Discussed with the ER midlevel provider who requested hospitalization      DVT prophylaxis:  No DVT prophylaxis order currently exists.    CODE STATUS:    Level Of Support Discussed With: Patient  Code Status (Patient has no pulse and is not breathing): CPR (Attempt to Resuscitate)  Medical Interventions (Patient has pulse or is breathing): Full Support      Admission Status:  I believe this patient meets inpatient status.    Electronically signed by Mariam Cartagena MD, 08/10/23, 4:28 PM EDT.    Portions of this documentation were transcribed electronically from a voice recognition software.  I confirm all data accurately represents the service(s) I performed at today's visit.           Electronically signed by Mariam Cartagena MD at 08/10/23 1630          Emergency Department Notes        Kaitlin Contreras APRN at 08/10/23 1426          Emergency Department Encounter    Date seen: 8/10/2023  Time: 2:26 PM EDT    Room number: 225/1    Chief Complaint: hip pain     HPI    History of Present Illness:  Patient is a 78 y.o. year old male who  presents to the emergency department for evaluation of right hip and upper leg pain.  Patient reports he was stepping off of his tractor 1 week and 1 day ago when he stepped on a 2 x 4 that he was using as a chalk behind his tire.  The 2 x 4 is reported to have slipped out from underneath him causing him to fall landing on his right side. Pt fell on concrete.  He has been using a walker to help ambulate since then, which he normally does not have to do. He states that last night he went to stand up and had an sudden sharp pain in right hip and upper leg. Since then he has not been able to bear weight to that extremity. He also sustained a skin tear to his right upper arm at time of fall. He denies striking his head or any LOC.     Independent Historian/Clinical History and Information was obtained by:   Patient    History is limited by: N/A      PCP: Provider, No Known        Past Medical History:     No Known Allergies  History reviewed. No pertinent past medical history.  History reviewed. No pertinent surgical history.  History reviewed. No pertinent family history.    Home Medications:  Prior to Admission medications    Medication Sig Start Date End Date Taking? Authorizing Provider   naproxen sodium (ALEVE) 220 MG tablet Take 220 mg by mouth 2 (Two) Times a Day As Needed.    Emergency, Nurse Vikki, RN        Social History:   Social History     Tobacco Use    Smoking status: Every Day     Packs/day: 1.00     Years: 70.00     Pack years: 70.00     Types: Cigarettes    Smokeless tobacco: Current   Vaping Use    Vaping Use: Never used   Substance Use Topics    Alcohol use: Yes     Comment: occasionally    Drug use: Never       All labs were reviewed and interpreted by me.  All X-rays impressions were independently interpreted by me.  EKG was interpreted by me.      Review of Systems:  Review of Systems   Constitutional:  Negative for chills and fever.   HENT:  Negative for congestion, ear pain and sore throat.   "  Eyes:  Negative for pain.   Respiratory:  Negative for cough, chest tightness and shortness of breath.    Cardiovascular:  Negative for chest pain.   Gastrointestinal:  Negative for abdominal pain, diarrhea, nausea and vomiting.   Genitourinary:  Negative for flank pain and hematuria.   Musculoskeletal:  Positive for arthralgias (Right hip and hamstring area pain). Negative for joint swelling.   Skin:  Positive for wound (Right upper arm). Negative for pallor.   Neurological:  Negative for seizures and headaches.   All other systems reviewed and are negative.     Physical Exam:  /88 (BP Location: Left arm, Patient Position: Sitting)   Pulse 92   Temp 98.2 øF (36.8 øC) (Oral)   Resp 18   Ht 190.5 cm (75\")   Wt 88 kg (194 lb)   SpO2 96%   BMI 24.25 kg/mý     Physical Exam  Vitals and nursing note reviewed.   Constitutional:       General: He is not in acute distress.     Appearance: Normal appearance. He is not ill-appearing or toxic-appearing.   HENT:      Head: Normocephalic and atraumatic.      Mouth/Throat:      Mouth: Mucous membranes are moist.   Eyes:      General: No scleral icterus.  Cardiovascular:      Rate and Rhythm: Normal rate and regular rhythm.      Pulses: Normal pulses.      Heart sounds: Normal heart sounds.   Pulmonary:      Effort: Pulmonary effort is normal. No respiratory distress.      Breath sounds: Normal breath sounds.   Abdominal:      General: Abdomen is flat. There is no distension.      Palpations: Abdomen is soft.      Tenderness: There is no abdominal tenderness.   Musculoskeletal:         General: Tenderness and signs of injury present. No swelling or deformity. Normal range of motion.      Cervical back: Normal range of motion and neck supple.      Comments: There is no internal or external rotation appreciated in legs, lower extremities, are of equal length there is no shortening on exam   Skin:     General: Skin is warm and dry.      Capillary Refill: Capillary " refill takes less than 2 seconds.   Neurological:      Mental Status: He is alert and oriented to person, place, and time. Mental status is at baseline.      Sensory: No sensory deficit.                Procedures:  Procedures      Medical Decision Making:      Comorbidities that affect care:    Smoking    External Notes reviewed:    Previous Clinic Note: Clinic note reviewed from 9/3/2021 where patient was seen at a local urgent care center for a corneal abrasion      The following orders were placed and all results were independently analyzed by me:  Orders Placed This Encounter   Procedures    XR Hip With or Without Pelvis 2 - 3 View Right    XR Chest 1 View    Comprehensive Metabolic Panel    aPTT    Lipase    CBC Auto Differential    NPO Diet NPO Type: Strict NPO    Code Status and Medical Interventions:    IP General Consult (Use specialty-specific consult if known)    IP General Consult (Use specialty-specific consult if known)    ECG 12 Lead Pre-Op / Pre-Procedure    Insert Peripheral IV    Inpatient Admission    CBC & Differential       Medications Given in the Emergency Department:  Medications   sodium chloride 0.9 % flush 10 mL (has no administration in time range)        ED Course:    ED Course as of 08/10/23 1634   Thu Aug 10, 2023   1515 Dr. Colindres consulted. He would like pt to be admitted to hospital and made NPO after midnight.  [MS]   1534 Order placed for hospitalist consult at this time for admission to hospital  [MS]   1539 Spoke with Dr. Bronson and pt is to be admitted to Hospital Via Dr. Cartagena [MS]   1545 Spoke with Dr. Cartagena who will be admitting pt [MS]   1629 EKG shows a sinus rhythm with no ST elevation or other indications of lethal or fatal dysrhythmias.   [MS]      ED Course User Index  [MS] Kaitlin Contreras APRN       Labs:    Lab Results (last 24 hours)       Procedure Component Value Units Date/Time    Comprehensive Metabolic Panel [[id number]] Collected: 08/10/23  1427    Specimen: Blood Updated: 08/10/23 1507     Glucose 87 mg/dL      BUN 23 mg/dL      Creatinine 1.00 mg/dL      Sodium 139 mmol/L      Potassium 4.2 mmol/L      Chloride 103 mmol/L      CO2 27.6 mmol/L      Calcium 9.2 mg/dL      Total Protein 7.5 g/dL      Albumin 4.0 g/dL      ALT (SGPT) 11 U/L      AST (SGOT) 13 U/L      Alkaline Phosphatase 81 U/L      Total Bilirubin 0.4 mg/dL      Globulin 3.5 gm/dL      A/G Ratio 1.1 g/dL      BUN/Creatinine Ratio 23.0     Anion Gap 8.4 mmol/L      eGFR 77.5 mL/min/1.73     Narrative:      GFR Normal >60  Chronic Kidney Disease <60  Kidney Failure <15    The GFR formula is only valid for adults with stable renal function between ages 18 and 70.    CBC & Differential [[id number]]  (Normal) Collected: 08/10/23 1427    Specimen: Blood Updated: 08/10/23 1444    Narrative:      The following orders were created for panel order CBC & Differential.  Procedure                               Abnormality         Status                     ---------                               -----------         ------                     CBC Auto Differential[[id number]]        Normal              Final result                 Please view results for these tests on the individual orders.    aPTT [[id number]]  (Abnormal) Collected: 08/10/23 1427    Specimen: Blood Updated: 08/10/23 1458     PTT 29.1 seconds     Lipase [[id number]]  (Normal) Collected: 08/10/23 1427    Specimen: Blood Updated: 08/10/23 1507     Lipase 26 U/L     CBC Auto Differential [[id number]]  (Normal) Collected: 08/10/23 1427    Specimen: Blood Updated: 08/10/23 1444     WBC 8.50 10*3/mm3      RBC 5.04 10*6/mm3      Hemoglobin 15.7 g/dL      Hematocrit 46.0 %      MCV 91.3 fL      MCH 31.2 pg      MCHC 34.1 g/dL      RDW 12.5 %      RDW-SD 41.7 fl      MPV 9.7 fL      Platelets 223 10*3/mm3      Neutrophil % 61.3 %      Lymphocyte % 25.8 %      Monocyte % 9.2 %      Eosinophil % 2.9 %      Basophil % 0.4 %      Immature Grans %  0.4 %      Neutrophils, Absolute 5.22 10*3/mm3      Lymphocytes, Absolute 2.19 10*3/mm3      Monocytes, Absolute 0.78 10*3/mm3      Eosinophils, Absolute 0.25 10*3/mm3      Basophils, Absolute 0.03 10*3/mm3      Immature Grans, Absolute 0.03 10*3/mm3      nRBC 0.0 /100 WBC              Imaging:    XR Chest 1 View    Result Date: 8/10/2023  PROCEDURE: XR CHEST 1 VW  COMPARISON: [facility], , CHEST PA/AP & LAT 2V, 8/03/2016, 19:05.  INDICATIONS: PRE-OP FOR HIP FRACTURE TODAY - NO CHEST COMPLAINTS  FINDINGS:  Heart size and pulmonary vessels normal.  Pleural calcifications noted on the left.  Scarring or chronic atelectasis in the lung bases.  No acute pulmonary abnormality demonstrated         1. No acute cardiopulmonary disease  2. Pleural calcifications and bibasilar scarring or atelectasis       SPEEDY LOBO MD       Electronically Signed and Approved By: SPEEDY LOBO MD on 8/10/2023 at 14:33             XR Hip With or Without Pelvis 2 - 3 View Right    Result Date: 8/10/2023  PROCEDURE: XR HIP W OR WO PELVIS 2-3 VIEW RIGHT  COMPARISON: None  INDICATIONS: fall/injury with right hip pain and inability to bear weight on right leg  FINDINGS:  There is mild narrowing of the hip joint spaces bilaterally.  There is an acute intertrochanteric nondisplaced fracture of the right proximal femur.  Sacroiliac joints appear within normal limits.  No definite pelvic fracture.        1. Acute nondisplaced intertrochanteric fracture of the proximal right femur.      MEDHAT SCOTT MD       Electronically Signed and Approved By: MEDHAT SCOTT MD on 8/10/2023 at 13:29                Differential Diagnosis and Discussion:    Extremity Pain: Differential diagnosis includes but is not limited to soft tissue sprain, tendonitis, tendon injury, dislocation, fracture, deep vein thrombosis, arterial insufficiency, osteoarthritis, bursitis, and ligamentous damage.        Patient Care Considerations:    CT EXTREMITY: I  considered ordering an extremity CT, however fracture was obvious on x-ray and no additional imaging are required at this time.      Consultants/Shared Management Plan:    Hospitalist: I have discussed the case with Dr. Cartagena who agrees to accept the patient for admission.  Consultant: I have discussed the case with on-call orthopedic surgeon, Dr. Colindres, who states he will consult on patient once admitted to the hospital and taken to the operating room tomorrow.    Social Determinants of Health:    Patient is independent, reliable, and has access to care.       Disposition and Care Coordination:    Admit:   Through independent evaluation of the patient's history, physical, and imperical data, the patient meets criteria for observation/admission to the hospital.    MDM  Number of Diagnoses or Management Options  Closed nondisplaced intertrochanteric fracture of right femur, initial encounter: new and requires workup  Right knee pain, unspecified chronicity: new and does not require workup  Skin tear of right upper arm without complication, initial encounter: new and does not require workup     Amount and/or Complexity of Data Reviewed  Clinical lab tests: reviewed and ordered  Tests in the radiology section of [facility]: reviewed and ordered  Tests in the medicine section of [facility]: reviewed  Review and summarize past medical records: yes (I have personally reviewed patient's previous medical encounters.)  Discuss the patient with other providers: yes (I discussed this patient with Dr. Colindres who will take patient to the OR tomorrow.  I then spoke with on-call hospitalist Dr. Bronson as well as Dr. Cartagena.  Patient will be admitted to the hospital by Dr. Cartagena)  Independent visualization of images, tracings, or specimens: yes (EKG reviewed)    Risk of Complications, Morbidity, and/or Mortality  Presenting problems: high  Diagnostic procedures: moderate  Management options: high    Patient  Progress  Patient progress: stable       Final diagnoses:   Closed nondisplaced intertrochanteric fracture of right femur, initial encounter   Right knee pain, unspecified chronicity   Skin tear of right upper arm without complication, initial encounter        ED Disposition       ED Disposition   Decision to Admit    Condition   --    Comment   Level of Care: Med/Surg [1]   Diagnosis: Closed right hip fracture, initial encounter [[id number]]   Admitting Physician: MELVIN CORDOBA [[id number]]   Attending Physician: MELVIN CORDOBA [[id number]]   Isolate for COVID?: No [0]   Certification: I Certify That Inpatient Hospital Services Are Medically Necessary For Greater Than 2 Midnights                 This medical record created using voice recognition software.                       Kaitlin Contreras APRN  08/10/23 1634      Electronically signed by Kaitlin Contreras APRN at 08/10/23 1634       Lab Results (last 24 hours)       Procedure Component Value Units Date/Time    Comprehensive Metabolic Panel [[id number]] Collected: 08/10/23 1427    Specimen: Blood Updated: 08/10/23 1507     Glucose 87 mg/dL      BUN 23 mg/dL      Creatinine 1.00 mg/dL      Sodium 139 mmol/L      Potassium 4.2 mmol/L      Chloride 103 mmol/L      CO2 27.6 mmol/L      Calcium 9.2 mg/dL      Total Protein 7.5 g/dL      Albumin 4.0 g/dL      ALT (SGPT) 11 U/L      AST (SGOT) 13 U/L      Alkaline Phosphatase 81 U/L      Total Bilirubin 0.4 mg/dL      Globulin 3.5 gm/dL      A/G Ratio 1.1 g/dL      BUN/Creatinine Ratio 23.0     Anion Gap 8.4 mmol/L      eGFR 77.5 mL/min/1.73     Narrative:      GFR Normal >60  Chronic Kidney Disease <60  Kidney Failure <15    The GFR formula is only valid for adults with stable renal function between ages 18 and 70.    Lipase [[id number]]  (Normal) Collected: 08/10/23 1427    Specimen: Blood Updated: 08/10/23 1507     Lipase 26 U/L     aPTT [[id number]]  (Abnormal) Collected: 08/10/23 1427    Specimen: Blood  Updated: 08/10/23 1458     PTT 29.1 seconds     CBC & Differential [[id number]]  (Normal) Collected: 08/10/23 1427    Specimen: Blood Updated: 08/10/23 1444    Narrative:      The following orders were created for panel order CBC & Differential.  Procedure                               Abnormality         Status                     ---------                               -----------         ------                     CBC Auto Differential[[id number]]        Normal              Final result                 Please view results for these tests on the individual orders.    CBC Auto Differential [[id number]]  (Normal) Collected: 08/10/23 1427    Specimen: Blood Updated: 08/10/23 1444     WBC 8.50 10*3/mm3      RBC 5.04 10*6/mm3      Hemoglobin 15.7 g/dL      Hematocrit 46.0 %      MCV 91.3 fL      MCH 31.2 pg      MCHC 34.1 g/dL      RDW 12.5 %      RDW-SD 41.7 fl      MPV 9.7 fL      Platelets 223 10*3/mm3      Neutrophil % 61.3 %      Lymphocyte % 25.8 %      Monocyte % 9.2 %      Eosinophil % 2.9 %      Basophil % 0.4 %      Immature Grans % 0.4 %      Neutrophils, Absolute 5.22 10*3/mm3      Lymphocytes, Absolute 2.19 10*3/mm3      Monocytes, Absolute 0.78 10*3/mm3      Eosinophils, Absolute 0.25 10*3/mm3      Basophils, Absolute 0.03 10*3/mm3      Immature Grans, Absolute 0.03 10*3/mm3      nRBC 0.0 /100 WBC           Imaging Results (Last 24 Hours)       Procedure Component Value Units Date/Time    XR Chest 1 View [[id number]] Collected: 08/10/23 1433     Updated: 08/10/23 1436    Narrative:      PROCEDURE: XR CHEST 1 VW     COMPARISON: [facility], CR, CHEST PA/AP & LAT 2V, 8/03/2016, 19:05.     INDICATIONS: PRE-OP FOR HIP FRACTURE TODAY - NO CHEST COMPLAINTS     FINDINGS:   Heart size and pulmonary vessels normal.  Pleural calcifications noted on the left.  Scarring or   chronic atelectasis in the lung bases.  No acute pulmonary abnormality demonstrated       Impression:            1. No acute  cardiopulmonary disease     2. Pleural calcifications and bibasilar scarring or atelectasis                   SPEEDY LOBO MD         Electronically Signed and Approved By: SPEEDY LOBO MD on 8/10/2023 at 14:33                     XR Hip With or Without Pelvis 2 - 3 View Right [[id number]] Collected: 08/10/23 1330     Updated: 08/10/23 1333    Narrative:      PROCEDURE: XR HIP W OR WO PELVIS 2-3 VIEW RIGHT     COMPARISON: None     INDICATIONS: fall/injury with right hip pain and inability to bear weight on right leg     FINDINGS:   There is mild narrowing of the hip joint spaces bilaterally.  There is an acute intertrochanteric   nondisplaced fracture of the right proximal femur.  Sacroiliac joints appear within normal limits.    No definite pelvic fracture.       Impression:         1. Acute nondisplaced intertrochanteric fracture of the proximal right femur.               MEDHAT SCOTT MD         Electronically Signed and Approved By: MEDHAT SCOTT MD on 8/10/2023 at 13:29                           Orders (active)        Start     Ordered    08/11/23 0600  ceFAZolin in dextrose (ANCEF) IVPB solution 2,000 mg  On Call to O.R.         08/10/23 1635    08/11/23 0600  Basic Metabolic Panel  Morning Draw         08/10/23 1635    08/11/23 0600  CBC & Differential  Morning Draw         08/10/23 1635    08/11/23 0600  Magnesium  Morning Draw         08/10/23 1635    08/11/23 0600  Phosphorus  Morning Draw         08/10/23 1635    08/11/23 0600  Hepatic Function Panel  Morning Draw         08/10/23 1635    08/11/23 0001  NPO Diet NPO Type: Strict NPO  Diet Effective Midnight         08/10/23 1523    08/11/23 0001  NPO Diet NPO Type: Strict NPO  Diet Effective Midnight         08/10/23 1635    08/10/23 2100  sodium chloride 0.9 % flush 10 mL  Every 12 Hours Scheduled         08/10/23 1635    08/10/23 2100  sennosides-docusate (PERICOLACE) 8.6-50 MG per tablet 2 tablet  2 Times Daily        See Hyperspace for full  Linked Orders Report.    08/10/23 1635    08/10/23 2000  Vital Signs  Every 4 Hours       08/10/23 1635    08/10/23 1800  Oral Care  2 Times Daily       08/10/23 1635    08/10/23 1700  Strict Intake & Output  Every Hour       08/10/23 1635    08/10/23 1647  Inpatient Case Management  Consult  Once        Provider:  (Not yet assigned)    08/10/23 1646    08/10/23 1641  Inpatient Consult to Advance Care Planning  Once        Provider:  (Not yet assigned)    08/10/23 1646    08/10/23 1636  Intake & Output  Every Shift       08/10/23 1635    08/10/23 1636  Weigh Patient  Once         08/10/23 1635    08/10/23 1636  Insert Peripheral IV  Once         08/10/23 1635    08/10/23 1636  Saline Lock & Maintain IV Access  Continuous         08/10/23 1635    08/10/23 1636  Maintain Sequential Compression Device  Continuous         08/10/23 1635    08/10/23 1636  Diet: Regular/House Diet; Texture: Regular Texture (IDDSI 7); Fluid Consistency: Thin (IDDSI 0)  Diet Effective Now         08/10/23 1635    08/10/23 1635  HYDROcodone-acetaminophen (NORCO) 5-325 MG per tablet 1 tablet  Every 6 Hours PRN         08/10/23 1635    08/10/23 1635  HYDROcodone-acetaminophen (NORCO)  MG per tablet 1 tablet  Every 6 Hours PRN         08/10/23 1635    08/10/23 1635  morphine injection 2 mg  Every 6 Hours PRN         08/10/23 1635    08/10/23 1635  sodium chloride 0.9 % flush 10 mL  As Needed         08/10/23 1635    08/10/23 1635  sodium chloride 0.9 % infusion 40 mL  As Needed         08/10/23 1635    08/10/23 1635  polyethylene glycol (MIRALAX) packet 17 g  Daily PRN        See Hyperspace for full Linked Orders Report.    08/10/23 1635    08/10/23 1635  bisacodyl (DULCOLAX) EC tablet 5 mg  Daily PRN        See Hyperspace for full Linked Orders Report.    08/10/23 1635    08/10/23 1635  bisacodyl (DULCOLAX) suppository 10 mg  Daily PRN        See Hyperspace for full Linked Orders Report.    08/10/23 1635    08/10/23 1631   acetaminophen (TYLENOL) tablet 650 mg  Every 4 Hours PRN         08/10/23 1635    08/10/23 1635  ondansetron (ZOFRAN) injection 4 mg  Every 6 Hours PRN         08/10/23 1635    08/10/23 1635  aluminum-magnesium hydroxide-simethicone (MAALOX MAX) 400-400-40 MG/5ML suspension 15 mL  Every 6 Hours PRN         08/10/23 1635    08/10/23 1548  Code Status and Medical Interventions:  Continuous         08/10/23 1549    08/10/23 1532  IP General Consult (Use specialty-specific consult if known)  Once        Provider:  (Not yet assigned)    08/10/23 1531    08/10/23 1510  IP General Consult (Use specialty-specific consult if known)  Once        Provider:  Elias Colindres MD    08/10/23 1509    08/10/23 1354  Insert Peripheral IV  Once        See Hyperspace for full Linked Orders Report.    08/10/23 1354    08/10/23 1353  sodium chloride 0.9 % flush 10 mL  As Needed        See Hyperspace for full Linked Orders Report.    08/10/23 1354    Unscheduled  Up With Assistance  As Needed       08/10/23 1635                     Consult Notes (last 24 hours)        Elias Colindres MD at 08/10/23 1725            [facility]   Consult Note    Patient Name: Anshul Meyer  : 1945  MRN: [id number]  Primary Care Physician:  Provider, No Known  Referring Physician: No ref. provider found  Date of admission: 8/10/2023    Subjective   Subjective     Reason for Consult/ Chief Complaint: Right hip fracture    HPI:  Anshul Meyer is a 78 y.o. male who had a mechanical fall about a week ago.  He was initially doing pretty well and thought he was getting better.  However yesterday he had acute increase in his right hip pain.  He was seen in the emergency department and x-rays revealed a right intertrochanteric proximal femur fracture.  The patient reports right hip pain.  He has pain with weightbearing and range of motion of the hip.  He is using a walker to ambulate.    Review of Systems   All systems were reviewed and negative  except for those mentioned in HPI    Personal History     History reviewed. No pertinent past medical history.    History reviewed. No pertinent surgical history.    Family History: family history is not on file. Otherwise pertinent FHx was reviewed and not pertinent to current issue.    Social History:  reports that he has been smoking cigarettes. He has a 70.00 pack-year smoking history. He uses smokeless tobacco. He reports current alcohol use. He reports that he does not use drugs.    Home Medications:       Allergies:  No Known Allergies    Objective    Objective     Vitals:   Temp:  [97.7 øF (36.5 øC)-98.2 øF (36.8 øC)] 97.7 øF (36.5 øC)  Heart Rate:  [80-92] 80  Resp:  [18-22] 22  BP: (130-149)/(68-88) 149/68    Physical Exam:   Constitutional: Awake, alert   Eyes: PERRLA, sclerae anicteric, no conjunctival injection   HENT: NCAT, mucous membranes moist   Neck: Supple,    Respiratory: Unlabored breathing   Cardiovascular: Regular heart rate   Gastrointestinal: Nontender and nondistended   Musculoskeletal: Tender to palpation right hip.  Pain with hip range of motion.  Positive pulses.  Pain with hip range of motion.  Pain with weightbearing.  Neurovascular intact extremity.   Psychiatric: Appropriate affect, cooperative   Neurologic: Oriented x 3, strength symmetric in all extremities, Cranial Nerves grossly intact to confrontation, speech clear   Skin: No rashes     Result Review    Result Review:  I have personally reviewed the results from the time of this admission to 8/10/2023 17:25 EDT and agree with these findings:  []  Laboratory  []  Microbiology  [x]  Radiology  []  EKG/Telemetry   []  Cardiology/Vascular   []  Pathology  []  Old records  []  Other:    Most notable findings include: Acute nondisplaced intertrochanteric proximal femur fracture    Assessment & Plan   Assessment / Plan     Brief Patient Summary:  Anshul Meyer is a 78 y.o. male who has nondisplaced right intertrochanteric proximal femur  fracture    Active Hospital Problems:  Active Hospital Problems    Diagnosis     **Closed 2-part intertrochanteric fracture of proximal end of right femur     Closed right hip fracture, initial encounter        Plan: I discussed treatment options with the patient.  Operative versus nonoperative treatment was discussed.  Risks and benefits of surgery were discussed.  Plan for n.p.o. after midnight and reduction and internal fixation of right proximal femur fracture tomorrow.  Thank you for the consultation.        Electronically signed by Elias Colindres MD, 08/10/23, 5:25 PM EDT.      Electronically signed by Elias Colindres MD at 08/10/23 8285

## 2023-08-10 NOTE — PLAN OF CARE
Goal Outcome Evaluation:            ER ADMIT, RIGHT FEMUR FRACTURE. SURGERY TOMORROW, NPO AT MIDNIGHT. NO COMPLAINTS.

## 2023-08-10 NOTE — H&P (VIEW-ONLY)
[facility]   Consult Note    Patient Name: Anshul Meyer  : 1945  MRN: [id number]  Primary Care Physician:  Provider, No Known  Referring Physician: No ref. provider found  Date of admission: 8/10/2023    Subjective   Subjective     Reason for Consult/ Chief Complaint: Right hip fracture    HPI:  Anshul Meyer is a 78 y.o. male who had a mechanical fall about a week ago.  He was initially doing pretty well and thought he was getting better.  However yesterday he had acute increase in his right hip pain.  He was seen in the emergency department and x-rays revealed a right intertrochanteric proximal femur fracture.  The patient reports right hip pain.  He has pain with weightbearing and range of motion of the hip.  He is using a walker to ambulate.    Review of Systems   All systems were reviewed and negative except for those mentioned in HPI    Personal History     History reviewed. No pertinent past medical history.    History reviewed. No pertinent surgical history.    Family History: family history is not on file. Otherwise pertinent FHx was reviewed and not pertinent to current issue.    Social History:  reports that he has been smoking cigarettes. He has a 70.00 pack-year smoking history. He uses smokeless tobacco. He reports current alcohol use. He reports that he does not use drugs.    Home Medications:       Allergies:  No Known Allergies    Objective    Objective     Vitals:   Temp:  [97.7 øF (36.5 øC)-98.2 øF (36.8 øC)] 97.7 øF (36.5 øC)  Heart Rate:  [80-92] 80  Resp:  [18-22] 22  BP: (130-149)/(68-88) 149/68    Physical Exam:   Constitutional: Awake, alert   Eyes: PERRLA, sclerae anicteric, no conjunctival injection   HENT: NCAT, mucous membranes moist   Neck: Supple,    Respiratory: Unlabored breathing   Cardiovascular: Regular heart rate   Gastrointestinal: Nontender and nondistended   Musculoskeletal: Tender to palpation right hip.  Pain with hip range of motion.  Positive pulses.  Pain  with hip range of motion.  Pain with weightbearing.  Neurovascular intact extremity.   Psychiatric: Appropriate affect, cooperative   Neurologic: Oriented x 3, strength symmetric in all extremities, Cranial Nerves grossly intact to confrontation, speech clear   Skin: No rashes     Result Review    Result Review:  I have personally reviewed the results from the time of this admission to 8/10/2023 17:25 EDT and agree with these findings:  []  Laboratory  []  Microbiology  [x]  Radiology  []  EKG/Telemetry   []  Cardiology/Vascular   []  Pathology  []  Old records  []  Other:    Most notable findings include: Acute nondisplaced intertrochanteric proximal femur fracture    Assessment & Plan   Assessment / Plan     Brief Patient Summary:  Anshul Meyer is a 78 y.o. male who has nondisplaced right intertrochanteric proximal femur fracture    Active Hospital Problems:  Active Hospital Problems    Diagnosis     **Closed 2-part intertrochanteric fracture of proximal end of right femur     Closed right hip fracture, initial encounter        Plan: I discussed treatment options with the patient.  Operative versus nonoperative treatment was discussed.  Risks and benefits of surgery were discussed.  Plan for n.p.o. after midnight and reduction and internal fixation of right proximal femur fracture tomorrow.  Thank you for the consultation.        Electronically signed by Elias Colindres MD, 08/10/23, 5:25 PM EDT.

## 2023-08-10 NOTE — CONSULTS
[facility]   Consult Note    Patient Name: Anshul Meyer  : 1945  MRN: [id number]  Primary Care Physician:  Provider, No Known  Referring Physician: No ref. provider found  Date of admission: 8/10/2023    Subjective   Subjective     Reason for Consult/ Chief Complaint: Right hip fracture    HPI:  Anshul Meyer is a 78 y.o. male who had a mechanical fall about a week ago.  He was initially doing pretty well and thought he was getting better.  However yesterday he had acute increase in his right hip pain.  He was seen in the emergency department and x-rays revealed a right intertrochanteric proximal femur fracture.  The patient reports right hip pain.  He has pain with weightbearing and range of motion of the hip.  He is using a walker to ambulate.    Review of Systems   All systems were reviewed and negative except for those mentioned in HPI    Personal History     History reviewed. No pertinent past medical history.    History reviewed. No pertinent surgical history.    Family History: family history is not on file. Otherwise pertinent FHx was reviewed and not pertinent to current issue.    Social History:  reports that he has been smoking cigarettes. He has a 70.00 pack-year smoking history. He uses smokeless tobacco. He reports current alcohol use. He reports that he does not use drugs.    Home Medications:       Allergies:  No Known Allergies    Objective    Objective     Vitals:   Temp:  [97.7 øF (36.5 øC)-98.2 øF (36.8 øC)] 97.7 øF (36.5 øC)  Heart Rate:  [80-92] 80  Resp:  [18-22] 22  BP: (130-149)/(68-88) 149/68    Physical Exam:   Constitutional: Awake, alert   Eyes: PERRLA, sclerae anicteric, no conjunctival injection   HENT: NCAT, mucous membranes moist   Neck: Supple,    Respiratory: Unlabored breathing   Cardiovascular: Regular heart rate   Gastrointestinal: Nontender and nondistended   Musculoskeletal: Tender to palpation right hip.  Pain with hip range of motion.  Positive pulses.  Pain  with hip range of motion.  Pain with weightbearing.  Neurovascular intact extremity.   Psychiatric: Appropriate affect, cooperative   Neurologic: Oriented x 3, strength symmetric in all extremities, Cranial Nerves grossly intact to confrontation, speech clear   Skin: No rashes     Result Review    Result Review:  I have personally reviewed the results from the time of this admission to 8/10/2023 17:25 EDT and agree with these findings:  []  Laboratory  []  Microbiology  [x]  Radiology  []  EKG/Telemetry   []  Cardiology/Vascular   []  Pathology  []  Old records  []  Other:    Most notable findings include: Acute nondisplaced intertrochanteric proximal femur fracture    Assessment & Plan   Assessment / Plan     Brief Patient Summary:  Anshul Meyer is a 78 y.o. male who has nondisplaced right intertrochanteric proximal femur fracture    Active Hospital Problems:  Active Hospital Problems    Diagnosis     **Closed 2-part intertrochanteric fracture of proximal end of right femur     Closed right hip fracture, initial encounter        Plan: I discussed treatment options with the patient.  Operative versus nonoperative treatment was discussed.  Risks and benefits of surgery were discussed.  Plan for n.p.o. after midnight and reduction and internal fixation of right proximal femur fracture tomorrow.  Thank you for the consultation.        Electronically signed by Elias Colindres MD, 08/10/23, 5:25 PM EDT.

## 2023-08-11 ENCOUNTER — ANESTHESIA (OUTPATIENT)
Dept: PERIOP | Facility: HOSPITAL | Age: 78
DRG: 482 | End: 2023-08-11
Payer: MEDICARE

## 2023-08-11 ENCOUNTER — APPOINTMENT (OUTPATIENT)
Dept: GENERAL RADIOLOGY | Facility: HOSPITAL | Age: 78
DRG: 482 | End: 2023-08-11
Payer: MEDICARE

## 2023-08-11 LAB
ALBUMIN SERPL-MCNC: 3.6 G/DL (ref 3.5–5.2)
ALP SERPL-CCNC: 71 U/L (ref 39–117)
ALT SERPL W P-5'-P-CCNC: 10 U/L (ref 1–41)
ANION GAP SERPL CALCULATED.3IONS-SCNC: 8.8 MMOL/L (ref 5–15)
AST SERPL-CCNC: 12 U/L (ref 1–40)
BASOPHILS # BLD AUTO: 0.03 10*3/MM3 (ref 0–0.2)
BASOPHILS NFR BLD AUTO: 0.4 % (ref 0–1.5)
BILIRUB CONJ SERPL-MCNC: 0.2 MG/DL (ref 0–0.3)
BILIRUB INDIRECT SERPL-MCNC: 0.3 MG/DL
BILIRUB SERPL-MCNC: 0.5 MG/DL (ref 0–1.2)
BUN SERPL-MCNC: 19 MG/DL (ref 8–23)
BUN/CREAT SERPL: 29.2 (ref 7–25)
CALCIUM SPEC-SCNC: 9 MG/DL (ref 8.6–10.5)
CHLORIDE SERPL-SCNC: 102 MMOL/L (ref 98–107)
CO2 SERPL-SCNC: 25.2 MMOL/L (ref 22–29)
CREAT SERPL-MCNC: 0.65 MG/DL (ref 0.76–1.27)
DEPRECATED RDW RBC AUTO: 40.9 FL (ref 37–54)
EGFRCR SERPLBLD CKD-EPI 2021: 97 ML/MIN/1.73
EOSINOPHIL # BLD AUTO: 0.35 10*3/MM3 (ref 0–0.4)
EOSINOPHIL NFR BLD AUTO: 4.6 % (ref 0.3–6.2)
ERYTHROCYTE [DISTWIDTH] IN BLOOD BY AUTOMATED COUNT: 12.2 % (ref 12.3–15.4)
GLUCOSE SERPL-MCNC: 105 MG/DL (ref 65–99)
HCT VFR BLD AUTO: 43.5 % (ref 37.5–51)
HGB BLD-MCNC: 15 G/DL (ref 13–17.7)
IMM GRANULOCYTES # BLD AUTO: 0.03 10*3/MM3 (ref 0–0.05)
IMM GRANULOCYTES NFR BLD AUTO: 0.4 % (ref 0–0.5)
LYMPHOCYTES # BLD AUTO: 2.17 10*3/MM3 (ref 0.7–3.1)
LYMPHOCYTES NFR BLD AUTO: 28.4 % (ref 19.6–45.3)
MAGNESIUM SERPL-MCNC: 2 MG/DL (ref 1.6–2.4)
MCH RBC QN AUTO: 31.3 PG (ref 26.6–33)
MCHC RBC AUTO-ENTMCNC: 34.5 G/DL (ref 31.5–35.7)
MCV RBC AUTO: 90.8 FL (ref 79–97)
MONOCYTES # BLD AUTO: 0.75 10*3/MM3 (ref 0.1–0.9)
MONOCYTES NFR BLD AUTO: 9.8 % (ref 5–12)
NEUTROPHILS NFR BLD AUTO: 4.3 10*3/MM3 (ref 1.7–7)
NEUTROPHILS NFR BLD AUTO: 56.4 % (ref 42.7–76)
NRBC BLD AUTO-RTO: 0 /100 WBC (ref 0–0.2)
PHOSPHATE SERPL-MCNC: 3.6 MG/DL (ref 2.5–4.5)
PLATELET # BLD AUTO: 222 10*3/MM3 (ref 140–450)
PMV BLD AUTO: 9.9 FL (ref 6–12)
POTASSIUM SERPL-SCNC: 4 MMOL/L (ref 3.5–5.2)
PROT SERPL-MCNC: 6.8 G/DL (ref 6–8.5)
RBC # BLD AUTO: 4.79 10*6/MM3 (ref 4.14–5.8)
SODIUM SERPL-SCNC: 136 MMOL/L (ref 136–145)
WBC NRBC COR # BLD: 7.63 10*3/MM3 (ref 3.4–10.8)

## 2023-08-11 PROCEDURE — 94761 N-INVAS EAR/PLS OXIMETRY MLT: CPT

## 2023-08-11 PROCEDURE — 25010000002 CEFAZOLIN PER 500 MG: Performed by: NURSE ANESTHETIST, CERTIFIED REGISTERED

## 2023-08-11 PROCEDURE — 25010000002 FENTANYL CITRATE (PF) 50 MCG/ML SOLUTION: Performed by: NURSE ANESTHETIST, CERTIFIED REGISTERED

## 2023-08-11 PROCEDURE — 25010000002 HYDROMORPHONE 1 MG/ML SOLUTION: Performed by: NURSE ANESTHETIST, CERTIFIED REGISTERED

## 2023-08-11 PROCEDURE — 85025 COMPLETE CBC W/AUTO DIFF WBC: CPT | Performed by: FAMILY MEDICINE

## 2023-08-11 PROCEDURE — 25010000002 CEFAZOLIN IN DEXTROSE 2-4 GM/100ML-% SOLUTION: Performed by: ORTHOPAEDIC SURGERY

## 2023-08-11 PROCEDURE — 0QH606Z INSERTION OF INTRAMEDULLARY INTERNAL FIXATION DEVICE INTO RIGHT UPPER FEMUR, OPEN APPROACH: ICD-10-PCS | Performed by: ORTHOPAEDIC SURGERY

## 2023-08-11 PROCEDURE — 25010000002 PROPOFOL 10 MG/ML EMULSION: Performed by: NURSE ANESTHETIST, CERTIFIED REGISTERED

## 2023-08-11 PROCEDURE — 27245 TREAT THIGH FRACTURE: CPT | Performed by: PHYSICIAN ASSISTANT

## 2023-08-11 PROCEDURE — 80048 BASIC METABOLIC PNL TOTAL CA: CPT | Performed by: FAMILY MEDICINE

## 2023-08-11 PROCEDURE — 84100 ASSAY OF PHOSPHORUS: CPT | Performed by: FAMILY MEDICINE

## 2023-08-11 PROCEDURE — 0QS636Z REPOSITION RIGHT UPPER FEMUR WITH INTRAMEDULLARY INTERNAL FIXATION DEVICE, PERCUTANEOUS APPROACH: ICD-10-PCS | Performed by: ORTHOPAEDIC SURGERY

## 2023-08-11 PROCEDURE — 27245 TREAT THIGH FRACTURE: CPT | Performed by: ORTHOPAEDIC SURGERY

## 2023-08-11 PROCEDURE — C1713 ANCHOR/SCREW BN/BN,TIS/BN: HCPCS | Performed by: ORTHOPAEDIC SURGERY

## 2023-08-11 PROCEDURE — C1769 GUIDE WIRE: HCPCS | Performed by: ORTHOPAEDIC SURGERY

## 2023-08-11 PROCEDURE — 76000 FLUOROSCOPY <1 HR PHYS/QHP: CPT

## 2023-08-11 PROCEDURE — BW1CZZZ FLUOROSCOPY OF LOWER EXTREMITY: ICD-10-PCS | Performed by: ORTHOPAEDIC SURGERY

## 2023-08-11 PROCEDURE — 94799 UNLISTED PULMONARY SVC/PX: CPT

## 2023-08-11 PROCEDURE — 25010000002 ONDANSETRON PER 1 MG: Performed by: NURSE ANESTHETIST, CERTIFIED REGISTERED

## 2023-08-11 PROCEDURE — 80076 HEPATIC FUNCTION PANEL: CPT | Performed by: FAMILY MEDICINE

## 2023-08-11 PROCEDURE — 83735 ASSAY OF MAGNESIUM: CPT | Performed by: FAMILY MEDICINE

## 2023-08-11 PROCEDURE — 25010000002 DEXAMETHASONE PER 1 MG: Performed by: NURSE ANESTHETIST, CERTIFIED REGISTERED

## 2023-08-11 PROCEDURE — 25010000002 SUGAMMADEX 200 MG/2ML SOLUTION: Performed by: NURSE ANESTHETIST, CERTIFIED REGISTERED

## 2023-08-11 DEVICE — ZNN CMN LAG SCREW 10.5X105
Type: IMPLANTABLE DEVICE | Site: FEMUR | Status: FUNCTIONAL
Brand: ZIMMER® NATURAL NAIL® SYSTEM

## 2023-08-11 DEVICE — SCRW CORT FA FUL/THRD HEX3.5 TI 5X37.5MM: Type: IMPLANTABLE DEVICE | Site: FEMUR | Status: FUNCTIONAL

## 2023-08-11 DEVICE — ZNN CMN NAIL 13MMX21.5CM 125R
Type: IMPLANTABLE DEVICE | Site: FEMUR | Status: FUNCTIONAL
Brand: ZIMMER® NATURAL NAIL® SYSTEM

## 2023-08-11 RX ORDER — ROCURONIUM BROMIDE 10 MG/ML
INJECTION, SOLUTION INTRAVENOUS AS NEEDED
Status: DISCONTINUED | OUTPATIENT
Start: 2023-08-11 | End: 2023-08-11 | Stop reason: SURG

## 2023-08-11 RX ORDER — LIDOCAINE HYDROCHLORIDE 20 MG/ML
INJECTION, SOLUTION EPIDURAL; INFILTRATION; INTRACAUDAL; PERINEURAL AS NEEDED
Status: DISCONTINUED | OUTPATIENT
Start: 2023-08-11 | End: 2023-08-11 | Stop reason: SURG

## 2023-08-11 RX ORDER — CEFAZOLIN SODIUM 2 G/100ML
2000 INJECTION, SOLUTION INTRAVENOUS EVERY 8 HOURS
Status: COMPLETED | OUTPATIENT
Start: 2023-08-11 | End: 2023-08-12

## 2023-08-11 RX ORDER — CEFAZOLIN SODIUM 1 G/3ML
INJECTION, POWDER, FOR SOLUTION INTRAMUSCULAR; INTRAVENOUS AS NEEDED
Status: DISCONTINUED | OUTPATIENT
Start: 2023-08-11 | End: 2023-08-11 | Stop reason: SURG

## 2023-08-11 RX ORDER — OXYCODONE HYDROCHLORIDE AND ACETAMINOPHEN 5; 325 MG/1; MG/1
1 TABLET ORAL EVERY 12 HOURS PRN
Status: DISCONTINUED | OUTPATIENT
Start: 2023-08-11 | End: 2023-08-11

## 2023-08-11 RX ORDER — HYDROCODONE BITARTRATE AND ACETAMINOPHEN 7.5; 325 MG/1; MG/1
1 TABLET ORAL EVERY 4 HOURS PRN
Status: DISCONTINUED | OUTPATIENT
Start: 2023-08-11 | End: 2023-08-13 | Stop reason: HOSPADM

## 2023-08-11 RX ORDER — HYDROCODONE BITARTRATE AND ACETAMINOPHEN 7.5; 325 MG/1; MG/1
2 TABLET ORAL EVERY 4 HOURS PRN
Status: DISCONTINUED | OUTPATIENT
Start: 2023-08-11 | End: 2023-08-13 | Stop reason: HOSPADM

## 2023-08-11 RX ORDER — DEXAMETHASONE SODIUM PHOSPHATE 4 MG/ML
INJECTION, SOLUTION INTRA-ARTICULAR; INTRALESIONAL; INTRAMUSCULAR; INTRAVENOUS; SOFT TISSUE AS NEEDED
Status: DISCONTINUED | OUTPATIENT
Start: 2023-08-11 | End: 2023-08-11 | Stop reason: SURG

## 2023-08-11 RX ORDER — MEPERIDINE HYDROCHLORIDE 25 MG/ML
12.5 INJECTION INTRAMUSCULAR; INTRAVENOUS; SUBCUTANEOUS
Status: DISCONTINUED | OUTPATIENT
Start: 2023-08-11 | End: 2023-08-11

## 2023-08-11 RX ORDER — MORPHINE SULFATE 2 MG/ML
2 INJECTION, SOLUTION INTRAMUSCULAR; INTRAVENOUS EVERY 4 HOURS PRN
Status: DISCONTINUED | OUTPATIENT
Start: 2023-08-11 | End: 2023-08-13 | Stop reason: HOSPADM

## 2023-08-11 RX ORDER — PROPOFOL 10 MG/ML
VIAL (ML) INTRAVENOUS AS NEEDED
Status: DISCONTINUED | OUTPATIENT
Start: 2023-08-11 | End: 2023-08-11 | Stop reason: SURG

## 2023-08-11 RX ORDER — ACETAMINOPHEN 325 MG/1
325 TABLET ORAL EVERY 4 HOURS PRN
Status: DISCONTINUED | OUTPATIENT
Start: 2023-08-11 | End: 2023-08-13 | Stop reason: HOSPADM

## 2023-08-11 RX ORDER — SODIUM CHLORIDE 9 MG/ML
100 INJECTION, SOLUTION INTRAVENOUS CONTINUOUS
Status: DISCONTINUED | OUTPATIENT
Start: 2023-08-11 | End: 2023-08-13 | Stop reason: HOSPADM

## 2023-08-11 RX ORDER — SUCCINYLCHOLINE/SOD CL,ISO/PF 100 MG/5ML
SYRINGE (ML) INTRAVENOUS AS NEEDED
Status: DISCONTINUED | OUTPATIENT
Start: 2023-08-11 | End: 2023-08-11 | Stop reason: SURG

## 2023-08-11 RX ORDER — FENTANYL CITRATE 50 UG/ML
INJECTION, SOLUTION INTRAMUSCULAR; INTRAVENOUS AS NEEDED
Status: DISCONTINUED | OUTPATIENT
Start: 2023-08-11 | End: 2023-08-11 | Stop reason: SURG

## 2023-08-11 RX ORDER — SODIUM CHLORIDE, SODIUM LACTATE, POTASSIUM CHLORIDE, CALCIUM CHLORIDE 600; 310; 30; 20 MG/100ML; MG/100ML; MG/100ML; MG/100ML
INJECTION, SOLUTION INTRAVENOUS CONTINUOUS PRN
Status: DISCONTINUED | OUTPATIENT
Start: 2023-08-11 | End: 2023-08-11 | Stop reason: SURG

## 2023-08-11 RX ORDER — PROMETHAZINE HYDROCHLORIDE 12.5 MG/1
25 TABLET ORAL ONCE AS NEEDED
Status: DISCONTINUED | OUTPATIENT
Start: 2023-08-11 | End: 2023-08-11

## 2023-08-11 RX ORDER — PROMETHAZINE HYDROCHLORIDE 25 MG/1
25 SUPPOSITORY RECTAL ONCE AS NEEDED
Status: DISCONTINUED | OUTPATIENT
Start: 2023-08-11 | End: 2023-08-11

## 2023-08-11 RX ORDER — ONDANSETRON 2 MG/ML
INJECTION INTRAMUSCULAR; INTRAVENOUS AS NEEDED
Status: DISCONTINUED | OUTPATIENT
Start: 2023-08-11 | End: 2023-08-11 | Stop reason: SURG

## 2023-08-11 RX ORDER — NALOXONE HCL 0.4 MG/ML
0.4 VIAL (ML) INJECTION
Status: DISCONTINUED | OUTPATIENT
Start: 2023-08-11 | End: 2023-08-13 | Stop reason: HOSPADM

## 2023-08-11 RX ORDER — PHENYLEPHRINE HCL IN 0.9% NACL 1 MG/10 ML
SYRINGE (ML) INTRAVENOUS AS NEEDED
Status: DISCONTINUED | OUTPATIENT
Start: 2023-08-11 | End: 2023-08-11 | Stop reason: SURG

## 2023-08-11 RX ORDER — ONDANSETRON 2 MG/ML
4 INJECTION INTRAMUSCULAR; INTRAVENOUS ONCE AS NEEDED
Status: DISCONTINUED | OUTPATIENT
Start: 2023-08-11 | End: 2023-08-11

## 2023-08-11 RX ADMIN — Medication 100 MCG: at 14:13

## 2023-08-11 RX ADMIN — SODIUM CHLORIDE, POTASSIUM CHLORIDE, SODIUM LACTATE AND CALCIUM CHLORIDE: 600; 310; 30; 20 INJECTION, SOLUTION INTRAVENOUS at 13:45

## 2023-08-11 RX ADMIN — FENTANYL CITRATE 25 MCG: 50 INJECTION, SOLUTION INTRAMUSCULAR; INTRAVENOUS at 14:37

## 2023-08-11 RX ADMIN — CEFAZOLIN SODIUM 2 G: 1 INJECTION, POWDER, FOR SOLUTION INTRAMUSCULAR; INTRAVENOUS at 14:05

## 2023-08-11 RX ADMIN — OXYCODONE AND ACETAMINOPHEN 1 TABLET: 5; 325 TABLET ORAL at 15:29

## 2023-08-11 RX ADMIN — PROPOFOL 150 MG: 10 INJECTION, EMULSION INTRAVENOUS at 13:55

## 2023-08-11 RX ADMIN — Medication 100 MCG: at 14:30

## 2023-08-11 RX ADMIN — SUGAMMADEX 200 MG: 100 INJECTION, SOLUTION INTRAVENOUS at 14:36

## 2023-08-11 RX ADMIN — Medication 200 MCG: at 14:05

## 2023-08-11 RX ADMIN — ROCURONIUM BROMIDE 50 MG: 50 INJECTION INTRAVENOUS at 14:05

## 2023-08-11 RX ADMIN — HYDROMORPHONE HYDROCHLORIDE 0.5 MG: 1 INJECTION, SOLUTION INTRAMUSCULAR; INTRAVENOUS; SUBCUTANEOUS at 15:14

## 2023-08-11 RX ADMIN — LIDOCAINE HYDROCHLORIDE 50 MG: 20 INJECTION, SOLUTION EPIDURAL; INFILTRATION; INTRACAUDAL; PERINEURAL at 13:52

## 2023-08-11 RX ADMIN — FENTANYL CITRATE 50 MCG: 50 INJECTION, SOLUTION INTRAMUSCULAR; INTRAVENOUS at 13:53

## 2023-08-11 RX ADMIN — ONDANSETRON 4 MG: 2 INJECTION INTRAMUSCULAR; INTRAVENOUS at 14:33

## 2023-08-11 RX ADMIN — FENTANYL CITRATE 25 MCG: 50 INJECTION, SOLUTION INTRAMUSCULAR; INTRAVENOUS at 14:41

## 2023-08-11 RX ADMIN — Medication 10 ML: at 09:56

## 2023-08-11 RX ADMIN — SENNOSIDES AND DOCUSATE SODIUM 2 TABLET: 50; 8.6 TABLET ORAL at 20:29

## 2023-08-11 RX ADMIN — HYDROCODONE BITARTRATE AND ACETAMINOPHEN 1 TABLET: 7.5; 325 TABLET ORAL at 20:28

## 2023-08-11 RX ADMIN — SODIUM CHLORIDE 100 ML/HR: 9 INJECTION, SOLUTION INTRAVENOUS at 15:52

## 2023-08-11 RX ADMIN — CEFAZOLIN SODIUM 2000 MG: 2 INJECTION, SOLUTION INTRAVENOUS at 21:38

## 2023-08-11 RX ADMIN — DEXAMETHASONE SODIUM PHOSPHATE 4 MG: 4 INJECTION, SOLUTION INTRAMUSCULAR; INTRAVENOUS at 14:33

## 2023-08-11 RX ADMIN — HYDROMORPHONE HYDROCHLORIDE 0.5 MG: 1 INJECTION, SOLUTION INTRAMUSCULAR; INTRAVENOUS; SUBCUTANEOUS at 15:03

## 2023-08-11 RX ADMIN — Medication 100 MG: at 13:56

## 2023-08-11 NOTE — OP NOTE
FEMUR INTRAMEDULLARY NAILING/RODDING  Procedure Report    Patient Name:  Anshul Meyer  YOB: 1945    Date of Surgery:  8/11/2023     Indications: The patient is a 78-year-old male who fell and sustained a right intertrochanteric proximal femur fracture.  He was seen in the emergency department and admitted to the hospital.  Risks and benefits of operative versus nonoperative treatment were discussed.  Informed consent was obtained and the patient wishes to proceed with hip cephalomedullary nailing.  Risk of bleeding, infection, damage to neurovascular structures, heart attack, stroke, DVT/PE, anesthesia complications including death, continued pain and disability, among others were discussed.  Informed consent was obtained and he wished to proceed.    Pre-op Diagnosis:   Closed 2-part intertrochanteric fracture of right femur, initial encounter [S72.141A]       Post-Op Diagnosis Codes:     * Closed 2-part intertrochanteric fracture of right femur, initial encounter [S72.141A]    Procedure/CPTr Codes:      Procedure(s):  Supplement joint nailing of right proximal femur fracture  Staff:  Surgeon(s):  Elias Colindres MD    Assistant: Robb Rose PA    Anesthesia: Choice    Estimated Blood Loss: 20 cc    Implants:    Implant Name Type Inv. Item Serial No.  Lot No. LRB No. Used Action   NAIL IM/FEM CEPH TI 13MM 21.5CM SHT RT STRL - [id number] Implant NAIL IM/FEM CEPH TI 13MM 21.5CM SHT RT STRL  [employer] [id number] Right 1 Implanted   SCRW LAG CEPH TI [id number] - [id number] Implant SCRW LAG CEPH TI [id number]  [employer] [id number] Right 1 Implanted   SCRW KENN FA FUL/THRD HEX3.5 TI 5X37.5MM - [id number] Implant SCRW KENN FA FUL/THRD HEX3.5 TI 5X37.5MM  [employer] [id number] Right 1 Implanted       Specimen:          None        Findings: Right intertrochanteric proximal femur fracture    Complications: None    Description of Procedure: The operative site was marked in  the preoperative holding area.  The patient was brought to the operating room.  Anesthesia was given.  The patient was placed supine on the [place] operative table. Well-padded traction boots were placed to the lower extremities. All bony prominences were padded.  We then placed a padded perineal post. Formal time out was held.  The patient received preoperative antibiotic. Traction and internal rotation was placed across the operative lower extremity. The nonoperative leg was extended and adducted.  Fluoroscopic imaging was taken AP and lateral plane to confirm anatomic reduction.    At this point, after prepping and draping, a longitudinal incision was made just proximal to the greater trochanter.  Subcutaneous tissues and fascia were divided.  A guide pin was placed at the tip of the greater trochanter, was driven into the intramedullary canal, centered on the canal on the lateral view.  The opening reamer and soft tissue protector were then used to create an opening hole with the greater trochanter and a ball-tipped guidewire was then inserted down into the distal femur.  This was confirmed to be at the center of the knee on both AP/lateral and fluoroscopic imaging.  The nail was measured.  We then began reaming sequentially the femur, reaming to 2mm greater than the chosen nail size. The nail was inserted over the guide wire. The guide arm was placed onto the nail for aiming the lag screw.  A stab incision was made at the lateral thigh, and the fascia was divided.  The trocar was then inserted to the lateral femur.  The guide pin was drilled in the center of the femoral head on the AP and lateral view, and the lag screw length was measured.  The drill was used to drill for the lag screw and then an appropriately sized lag screw was inserted. At this point, the locking bolt was placed in the proximal nail and was tightened.  The trocar for the distal interlocking screw was placed.  A stab incision was made and the  trocar was placed onto the lateral femur.  The drill was used and the screw length was measured.  The interlocking screw was then inserted.  The aiming arm was removed and final fluoroscopic images were taken of the hip showing good reduction of the fracture and hardware position.  The wounds were irrigated.  They were closed with 2-0 Vicryl, skin closed with staples.  A sterile dressing was placed.  The patient was awoken from anesthesia in stable condition.  There were no complications.  All counts were correct.  Stable to recovery.    Assistant: Robb Rose PA  was responsible for performing the following activities: Retraction, Suction, Irrigation, and Placing Dressing and their skilled assistance was necessary for the success of this case.    Elias Colindres MD     Date: 8/11/2023  Time: 14:50 EDT

## 2023-08-11 NOTE — ANESTHESIA POSTPROCEDURE EVALUATION
Patient: Anshul Meyer    Procedure Summary       Date: 08/11/23 Room / Location: [facility] OR 08 / [facility] MAIN OR    Anesthesia Start: 1345 Anesthesia Stop: 1457    Procedure: FEMUR INTRAMEDULLARY NAILING/RODDING (Right: Thigh) Diagnosis:       Closed 2-part intertrochanteric fracture of right femur, initial encounter      (Closed 2-part intertrochanteric fracture of right femur, initial encounter [S72.141A])    Surgeons: Elias Colindres MD Provider: Antonino Boucher CRNA    Anesthesia Type: general ASA Status: 2            Anesthesia Type: general    Vitals  Vitals Value Taken Time   /88 08/11/23 1507   Temp 36.7 øC (98.1 øF) 08/11/23 1455   Pulse 72 08/11/23 1509   Resp 16 08/11/23 1500   SpO2 91 % 08/11/23 1509   Vitals shown include unvalidated device data.        Post Anesthesia Care and Evaluation    Patient location during evaluation: bedside  Patient participation: complete - patient participated  Level of consciousness: awake  Pain score: 2  Pain management: adequate    Airway patency: patent  PONV Status: none  Cardiovascular status: acceptable and stable  Respiratory status: acceptable  Hydration status: acceptable

## 2023-08-11 NOTE — PROGRESS NOTES
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 Given this finding, orthopedics was consulted, hospitalized for further monitoring and management, status post supplement joint nailing of right proximal femur fracture    Interval follow-up: Seen and examined prior to surgery, no acute distress, no acute major night events, was awaiting surgery, pain was tolerable.  No chest pain or palpitations.  Creatinine 0.65, potassium 4.0, sodium 136, white blood cell count 7000, hemoglobin 15.    Review of systems: Delete that  All systems reviewed and negative except for right hip pain    Objective   Objective     Vitals:   Temp:  [97.7 øF (36.5 øC)-98.8 øF (37.1 øC)] 97.7 øF (36.5 øC)  Heart Rate:  [65-80] 66  Resp:  [12-22] 14  BP: (127-164)/(68-91) 152/80  Flow (L/min):  [1.5] 1.5  Physical Exam               Constitutional: Awake, alert, no acute distress              Eyes: Pupils equal, sclerae anicteric, no conjunctival injection              HENT: NCAT, mucous membranes moist              Neck: Supple, full range of motion              Respiratory: Diminished to auscultation bilaterally, nonlabored respirations               Cardiovascular: RRR, no rubs, or gallops, palpable pedal pulses bilaterally              Gastrointestinal: Positive bowel sounds, soft, nontender, nondistended              Musculoskeletal: No bilateral ankle edema, no clubbing or cyanosis to extremities, tenderness to the right hip area              Psychiatric: Appropriate affect,  cooperative              Neurologic: Oriented x 3, strength symmetric in all extremities, not tested right leg, distal right extremity neurovascular intact, Cranial Nerves grossly intact to confrontation, speech clear              Skin: No rashes visible on exposed skin    Result Review    Result Review:  I have personally reviewed the pertinent results from the past 24 hours to 8/11/2023 16:30 EDT and agree with these findings:  [x]  Laboratory   CBC          8/10/2023    14:27 8/11/2023    04:03   CBC   WBC 8.50  7.63    RBC 5.04  4.79    Hemoglobin 15.7  15.0    Hematocrit 46.0  43.5    MCV 91.3  90.8    MCH 31.2  31.3    MCHC 34.1  34.5    RDW 12.5  12.2    Platelets 223  222      BMP          8/10/2023    14:27 8/11/2023    04:03   BMP   BUN 23  19    Creatinine 1.00  0.65    Sodium 139  136    Potassium 4.2  4.0    Chloride 103  102    CO2 27.6  25.2    Calcium 9.2  9.0      LIVER FUNCTION TESTS:      Lab 08/11/23  0403 08/10/23  1427   TOTAL PROTEIN 6.8 7.5   ALBUMIN 3.6 4.0   GLOBULIN  --  3.5   ALT (SGPT) 10 11   AST (SGOT) 12 13   BILIRUBIN 0.5 0.4   INDIRECT BILIRUBIN 0.3  --    BILIRUBIN DIRECT 0.2  --    ALK PHOS 71 81   LIPASE  --  26       [x]  Microbiology No results found for: ACANTHNAEG, AFBCX, BPERTUSSISCX, BLOODCX  No results found for: BCIDPCR, CXREFLEX, CSFCX, CULTURETIS  No results found for: CULTURES, HSVCX, URCX  No results found for: EYECULTURE, GCCX, HSVCULTURE, LABHSV  No results found for: LEGIONELLA, MRSACX, MUMPSCX, MYCOPLASCX  No results found for: NOCARDIACX, STOOLCX  No results found for: THROATCX, UNSTIMCULT, URINECX, CULTURE, VZVCULTUR  No results found for: VIRALCULTU, WOUNDCX    [x]  Radiology XR Chest 1 View    Result Date: 8/10/2023  PROCEDURE: XR CHEST 1 VW  COMPARISON: Psychiatric, CR, CHEST PA/AP & LAT 2V, 8/03/2016, 19:05.  INDICATIONS: PRE-OP FOR HIP FRACTURE TODAY - NO CHEST COMPLAINTS  FINDINGS:  Heart size and pulmonary vessels normal.  Pleural  calcifications noted on the left.  Scarring or chronic atelectasis in the lung bases.  No acute pulmonary abnormality demonstrated         1. No acute cardiopulmonary disease  2. Pleural calcifications and bibasilar scarring or atelectasis       SPEEDY LOBO MD       Electronically Signed and Approved By: SPEEDY LOBO MD on 8/10/2023 at 14:33             FL Surgery Fluoro    Result Date: 8/11/2023  This procedure was auto-finalized with no dictation required.    XR Hip With or Without Pelvis 2 - 3 View Right    Result Date: 8/10/2023  PROCEDURE: XR HIP W OR WO PELVIS 2-3 VIEW RIGHT  COMPARISON: None  INDICATIONS: fall/injury with right hip pain and inability to bear weight on right leg  FINDINGS:  There is mild narrowing of the hip joint spaces bilaterally.  There is an acute intertrochanteric nondisplaced fracture of the right proximal femur.  Sacroiliac joints appear within normal limits.  No definite pelvic fracture.        1. Acute nondisplaced intertrochanteric fracture of the proximal right femur.      MEDHAT SCOTT MD       Electronically Signed and Approved By: MEDHAT SCOTT MD on 8/10/2023 at 13:29               []  EKG/Telemetry   []  Cardiology/Vascular   []  Pathology  [x]  Old records  []  Other:    Assessment & Plan   Assessment / Plan     Assessment/Plan:  Assessment:  Acute nondisplaced intertrochanteric fracture of the proximal right femur status post repair  Current tobacco smoker  Bibasilar scarring, pleural calcifications     Plan:  Labs and imaging reviewed  Resume diet after surgery  PT/OT  Dr. Colindres consulted, discussed with them, recommendations appreciated  Chest x-ray and EKG reviewed  Start Eliquis for DVT prophylaxis  Pain control with Norco 5/325 and 10/325 mg every 6 hours as needed for moderate-severe pain respectively  Morphine every 6 hours as needed 2 mg for severe breakthrough pain  Bowel regimen  A.m. labs  Full code  DVT prophylaxis with Eliquis  Clinical course  dictate further management  Discussed with nurse at the bedside    DVT prophylaxis:  Medical and mechanical DVT prophylaxis orders are present.    CODE STATUS:   Level Of Support Discussed With: Patient  Code Status (Patient has no pulse and is not breathing): CPR (Attempt to Resuscitate)  Medical Interventions (Patient has pulse or is breathing): Full Support        Electronically signed by Mariam Cartagena MD, 08/11/23, 4:30 PM EDT.    Portions of this documentation were transcribed electronically from a voice recognition software.  I confirm all data accurately represents the service(s) I performed at today's visit.

## 2023-08-11 NOTE — PLAN OF CARE
Goal Outcome Evaluation:           Progress: improving     Patient had right femur nailing today. VSS. No complaints of nausea. Pain currently controlled. Family at bedside.

## 2023-08-11 NOTE — ANESTHESIA PREPROCEDURE EVALUATION
Anesthesia Evaluation     Patient summary reviewed and Nursing notes reviewed   no history of anesthetic complications:   NPO Solid Status: > 8 hours  NPO Liquid Status: > 2 hours           Airway   Mallampati: II  TM distance: >3 FB  Neck ROM: full  No difficulty expected  Dental    (+) lower dentures and partials    Pulmonary - negative pulmonary ROS and normal exam    breath sounds clear to auscultation  Cardiovascular - negative cardio ROS and normal exam  Exercise tolerance: good (4-7 METS)    Rhythm: regular  Rate: normal        Neuro/Psych- negative ROS  GI/Hepatic/Renal/Endo - negative ROS     Musculoskeletal (-) negative ROS    Abdominal    Substance History - negative use     OB/GYN negative ob/gyn ROS         Other - negative ROS       ROS/Med Hx Other: PAT Nursing Notes unavailable.                   Anesthesia Plan    ASA 2     general     Reason for not using neuraxial anesthesia or peripheral nerve block: Patient Preference  (Patient understands anesthesia not responsible for dental damage.  08/10/23 14:27  Sodium: 139  Potassium: 4.2  Chloride: 103  CO2: 27.6  Anion Gap: 8.4  BUN: 23  Creatinine: 1.00  BUN/Creatinine Ratio: 23.0  eGFR: 77.5  Glucose: 87  Calcium: 9.2  Alkaline Phosphatase: 81  Total Protein: 7.5  Albumin: 4.0  Globulin: 3.5  A/G Ratio: 1.1  AST (SGOT): 13  ALT (SGPT): 11  08/10/23 14:27  WBC: 8.50  RBC: 5.04  Hemoglobin: 15.7  Hematocrit: 46.0  Platelets: 223  RDW: 12.5  MCV: 91.3)  intravenous induction     Anesthetic plan, risks, benefits, and alternatives have been provided, discussed and informed consent has been obtained with: patient.  Pre-procedure education provided  Use of blood products discussed with patient .    Plan discussed with CRNA.      CODE STATUS:    Level Of Support Discussed With: Patient  Code Status (Patient has no pulse and is not breathing): CPR (Attempt to Resuscitate)  Medical Interventions (Patient has pulse or is breathing): Full Support

## 2023-08-11 NOTE — PLAN OF CARE
Goal Outcome Evaluation:  Plan of Care Reviewed With: patient        Progress: no change  Outcome Evaluation: pt. denied the need for pain medication this shift.  pt. has been npo since midnight for surgery today.  no significant changs noted.

## 2023-08-11 NOTE — PROGRESS NOTES
[facility]     Progress Note    Patient Name: Anshul Meyer  : 1945  MRN: [id number]  Primary Care Physician:  Provider, No Known  Date of admission: 8/10/2023    Subjective   Subjective     HPI:  Patient Reports n.p.o. since midnight.  No new complaints.  Patient awaiting surgery today.    Review of Systems   See HPI    Objective   Objective     Vitals:   Temp:  [97.7 øF (36.5 øC)-98.8 øF (37.1 øC)] 98.8 øF (37.1 øC)  Heart Rate:  [73-92] 76  Resp:  [16-22] 20  BP: (130-152)/(68-88) 148/86  Physical Exam    General: Alert, no acute distress   Chest: Unlabored breathing, cardiovascular: Regular heart rate   Skill skeletal: Neurovascular intact extremity.  Pain with hip range of motion.  Positive pulses.  Tender to palpation hip.    Result Review      WBC   Date Value Ref Range Status   2023 7.63 3.40 - 10.80 10*3/mm3 Final     RBC   Date Value Ref Range Status   2023 4.79 4.14 - 5.80 10*6/mm3 Final     Hemoglobin   Date Value Ref Range Status   2023 15.0 13.0 - 17.7 g/dL Final     Hematocrit   Date Value Ref Range Status   2023 43.5 37.5 - 51.0 % Final     MCV   Date Value Ref Range Status   2023 90.8 79.0 - 97.0 fL Final     MCH   Date Value Ref Range Status   2023 31.3 26.6 - 33.0 pg Final     MCHC   Date Value Ref Range Status   2023 34.5 31.5 - 35.7 g/dL Final     RDW   Date Value Ref Range Status   2023 12.2 (L) 12.3 - 15.4 % Final     RDW-SD   Date Value Ref Range Status   2023 40.9 37.0 - 54.0 fl Final     MPV   Date Value Ref Range Status   2023 9.9 6.0 - 12.0 fL Final     Platelets   Date Value Ref Range Status   2023 222 140 - 450 10*3/mm3 Final     Neutrophil %   Date Value Ref Range Status   2023 56.4 42.7 - 76.0 % Final     Lymphocyte %   Date Value Ref Range Status   2023 28.4 19.6 - 45.3 % Final     Monocyte %   Date Value Ref Range Status   2023 9.8 5.0 - 12.0 % Final     Eosinophil %   Date Value Ref  Range Status   08/11/2023 4.6 0.3 - 6.2 % Final     Basophil %   Date Value Ref Range Status   08/11/2023 0.4 0.0 - 1.5 % Final     Immature Grans %   Date Value Ref Range Status   08/11/2023 0.4 0.0 - 0.5 % Final     Neutrophils, Absolute   Date Value Ref Range Status   08/11/2023 4.30 1.70 - 7.00 10*3/mm3 Final     Lymphocytes, Absolute   Date Value Ref Range Status   08/11/2023 2.17 0.70 - 3.10 10*3/mm3 Final     Monocytes, Absolute   Date Value Ref Range Status   08/11/2023 0.75 0.10 - 0.90 10*3/mm3 Final     Eosinophils, Absolute   Date Value Ref Range Status   08/11/2023 0.35 0.00 - 0.40 10*3/mm3 Final     Basophils, Absolute   Date Value Ref Range Status   08/11/2023 0.03 0.00 - 0.20 10*3/mm3 Final     Immature Grans, Absolute   Date Value Ref Range Status   08/11/2023 0.03 0.00 - 0.05 10*3/mm3 Final     nRBC   Date Value Ref Range Status   08/11/2023 0.0 0.0 - 0.2 /100 WBC Final        Result Review:  I have personally reviewed the results from the time of this admission to 8/11/2023 12:06 EDT and agree with these findings:  [x]  Laboratory  []  Microbiology  [x]  Radiology  []  EKG/Telemetry   []  Cardiology/Vascular   []  Pathology  []  Old records  []  Other:      Assessment & Plan   Assessment / Plan     Brief Patient Summary:  Anshul Meyer is a 78 y.o. male who has right intertrochanteric proximal femur fracture    Active Hospital Problems:  Active Hospital Problems    Diagnosis     **Closed 2-part intertrochanteric fracture of proximal end of right femur     Closed right hip fracture, initial encounter      Plan: I discussed treatment options with the patient.  He wishes to proceed with operative treatment.  Plan for n.p.o. today and surgery later today when the OR available.       DVT prophylaxis:  Mechanical DVT prophylaxis orders are present.    CODE STATUS:   Level Of Support Discussed With: Patient  Code Status (Patient has no pulse and is not breathing): CPR (Attempt to Resuscitate)  Medical  Interventions (Patient has pulse or is breathing): Full Support    Disposition:  I expect patient to be discharged when medically able.    Electronically signed by Elias Colindres MD, 08/11/23, 12:06 PM EDT.

## 2023-08-12 LAB
HCT VFR BLD AUTO: 39.8 % (ref 37.5–51)
HGB BLD-MCNC: 13.8 G/DL (ref 13–17.7)

## 2023-08-12 PROCEDURE — 97161 PT EVAL LOW COMPLEX 20 MIN: CPT

## 2023-08-12 PROCEDURE — 85018 HEMOGLOBIN: CPT | Performed by: ORTHOPAEDIC SURGERY

## 2023-08-12 PROCEDURE — 97116 GAIT TRAINING THERAPY: CPT

## 2023-08-12 PROCEDURE — 85014 HEMATOCRIT: CPT | Performed by: ORTHOPAEDIC SURGERY

## 2023-08-12 PROCEDURE — 25010000002 CEFAZOLIN IN DEXTROSE 2-4 GM/100ML-% SOLUTION: Performed by: ORTHOPAEDIC SURGERY

## 2023-08-12 PROCEDURE — 94799 UNLISTED PULMONARY SVC/PX: CPT

## 2023-08-12 PROCEDURE — 97165 OT EVAL LOW COMPLEX 30 MIN: CPT

## 2023-08-12 PROCEDURE — 94760 N-INVAS EAR/PLS OXIMETRY 1: CPT

## 2023-08-12 RX ADMIN — SENNOSIDES AND DOCUSATE SODIUM 2 TABLET: 50; 8.6 TABLET ORAL at 09:15

## 2023-08-12 RX ADMIN — HYDROCODONE BITARTRATE AND ACETAMINOPHEN 1 TABLET: 7.5; 325 TABLET ORAL at 09:15

## 2023-08-12 RX ADMIN — Medication 10 ML: at 09:16

## 2023-08-12 RX ADMIN — APIXABAN 2.5 MG: 2.5 TABLET, FILM COATED ORAL at 09:14

## 2023-08-12 RX ADMIN — CEFAZOLIN SODIUM 2000 MG: 2 INJECTION, SOLUTION INTRAVENOUS at 05:24

## 2023-08-12 RX ADMIN — APIXABAN 2.5 MG: 2.5 TABLET, FILM COATED ORAL at 20:15

## 2023-08-12 RX ADMIN — HYDROCODONE BITARTRATE AND ACETAMINOPHEN 1 TABLET: 7.5; 325 TABLET ORAL at 20:15

## 2023-08-12 RX ADMIN — Medication 10 ML: at 20:16

## 2023-08-12 RX ADMIN — HYDROCODONE BITARTRATE AND ACETAMINOPHEN 1 TABLET: 7.5; 325 TABLET ORAL at 15:02

## 2023-08-12 NOTE — PLAN OF CARE
Goal Outcome Evaluation:  Plan of Care Reviewed With: patient        Progress: improving  Outcome Evaluation: pt medicated x1 for right hip pain with voiced relief. pt voiding per urinal. no changes in pt's status.

## 2023-08-12 NOTE — PLAN OF CARE
Goal Outcome Evaluation:  Plan of Care Reviewed With: patient        Progress: improving  Outcome Evaluation: Pt presents with decreased functional mobility due to recent surgery.  Presence of decreased strength and ROM contribute to decreased functional mobility.  skilled PT intervention is needed to address noted deficits to restore to PLOF      Anticipated Discharge Disposition (PT): home with home health

## 2023-08-12 NOTE — PLAN OF CARE
Problem: Adult Inpatient Plan of Care  Goal: Plan of Care Review  Outcome: Ongoing, Progressing  Goal: Patient-Specific Goal (Individualized)  Outcome: Ongoing, Progressing  Goal: Absence of Hospital-Acquired Illness or Injury  Outcome: Ongoing, Progressing  Intervention: Identify and Manage Fall Risk  Recent Flowsheet Documentation  Taken 8/12/2023 1610 by Silvana Coe RN  Safety Promotion/Fall Prevention: safety round/check completed  Taken 8/12/2023 1410 by Silvana Coe RN  Safety Promotion/Fall Prevention: safety round/check completed  Taken 8/12/2023 1210 by Silvana Coe RN  Safety Promotion/Fall Prevention: safety round/check completed  Taken 8/12/2023 1010 by Silvana Coe RN  Safety Promotion/Fall Prevention: safety round/check completed  Taken 8/12/2023 0801 by Silvana Coe RN  Safety Promotion/Fall Prevention: safety round/check completed  Taken 8/12/2023 0701 by Silvana Coe RN  Safety Promotion/Fall Prevention: safety round/check completed  Intervention: Prevent Skin Injury  Recent Flowsheet Documentation  Taken 8/12/2023 0912 by Silvana Coe RN  Skin Protection:   adhesive use limited   tubing/devices free from skin contact  Intervention: Prevent and Manage VTE (Venous Thromboembolism) Risk  Recent Flowsheet Documentation  Taken 8/12/2023 0912 by Silvana Coe RN  Activity Management: (Simultaneous filing. User may be unaware of other data.) ambulated to bathroom  Goal: Optimal Comfort and Wellbeing  Outcome: Ongoing, Progressing  Intervention: Monitor Pain and Promote Comfort  Recent Flowsheet Documentation  Taken 8/12/2023 0945 by Silvana Coe RN  Pain Management Interventions:   quiet environment facilitated   see MAR   cold applied   care clustered  Goal: Readiness for Transition of Care  Outcome: Ongoing, Progressing     Problem: Fall Injury Risk  Goal: Absence of Fall and Fall-Related Injury  Outcome: Ongoing, Progressing  Intervention: Identify and  Manage Contributors  Recent Flowsheet Documentation  Taken 8/12/2023 1610 by Silvana Coe RN  Medication Review/Management: medications reviewed  Taken 8/12/2023 1410 by Silvana Coe RN  Medication Review/Management: medications reviewed  Taken 8/12/2023 1210 by Silvana Coe RN  Medication Review/Management: medications reviewed  Taken 8/12/2023 1010 by Silvana Coe RN  Medication Review/Management: medications reviewed  Taken 8/12/2023 0912 by Silvana Coe RN  Self-Care Promotion: independence encouraged  Taken 8/12/2023 0801 by Silvana Coe RN  Medication Review/Management: medications reviewed  Taken 8/12/2023 0701 by Silvana Coe RN  Medication Review/Management: medications reviewed  Intervention: Promote Injury-Free Environment  Recent Flowsheet Documentation  Taken 8/12/2023 1610 by Silvana Coe RN  Safety Promotion/Fall Prevention: safety round/check completed  Taken 8/12/2023 1410 by Silvana Coe RN  Safety Promotion/Fall Prevention: safety round/check completed  Taken 8/12/2023 1210 by Silvana Coe RN  Safety Promotion/Fall Prevention: safety round/check completed  Taken 8/12/2023 1010 by Silvana Coe RN  Safety Promotion/Fall Prevention: safety round/check completed  Taken 8/12/2023 0801 by Silvana Coe RN  Safety Promotion/Fall Prevention: safety round/check completed  Taken 8/12/2023 0701 by Silvana Coe RN  Safety Promotion/Fall Prevention: safety round/check completed   Goal Outcome Evaluation:   Pt has had no new changes throughout shift and continues to remain stable. Pt is POD 2 of right hip IM nailing performed by Dr. Colindres. Pt has had little complaints of pain today that has been controlled with PRN medication. Pt has been medicated x2. Pt is x1 assist with walker to the BR. Pt has voided without issue. Pt is waiting on rehab placement.

## 2023-08-12 NOTE — PROGRESS NOTES
[facility]     Progress Note    Patient Name: Anshul Meyer  : 1945  MRN: [id number]  Primary Care Physician:  Provider, No Known  Date of admission: 8/10/2023    Subjective   Subjective     HPI:  Patient Reports doing well this morning.  He is sitting up in a chair.  He denies any chest pain or shortness of air.  His pain is controlled.    Review of Systems   See HPI    Objective   Objective     Vitals:   Temp:  [97.7 øF (36.5 øC)-98.8 øF (37.1 øC)] 97.8 øF (36.6 øC)  Heart Rate:  [65-87] 76  Resp:  [12-20] 16  BP: (105-164)/(62-91) 105/62  Flow (L/min):  [1.5-2] 2  Physical Exam    General: Alert, no acute distress   Chest: Unlabored breathing, cardiovascular: Regular heart rate   Musculoskeletal: Neurovascular intact extremity.  Positive pulses.  Dressing intact.  Equal leg lengths.  Negative Mari.    Result Review      WBC   Date Value Ref Range Status   2023 7.63 3.40 - 10.80 10*3/mm3 Final     RBC   Date Value Ref Range Status   2023 4.79 4.14 - 5.80 10*6/mm3 Final     Hemoglobin   Date Value Ref Range Status   2023 13.8 13.0 - 17.7 g/dL Final     Hematocrit   Date Value Ref Range Status   2023 39.8 37.5 - 51.0 % Final     MCV   Date Value Ref Range Status   2023 90.8 79.0 - 97.0 fL Final     MCH   Date Value Ref Range Status   2023 31.3 26.6 - 33.0 pg Final     MCHC   Date Value Ref Range Status   2023 34.5 31.5 - 35.7 g/dL Final     RDW   Date Value Ref Range Status   2023 12.2 (L) 12.3 - 15.4 % Final     RDW-SD   Date Value Ref Range Status   2023 40.9 37.0 - 54.0 fl Final     MPV   Date Value Ref Range Status   2023 9.9 6.0 - 12.0 fL Final     Platelets   Date Value Ref Range Status   2023 222 140 - 450 10*3/mm3 Final     Neutrophil %   Date Value Ref Range Status   2023 56.4 42.7 - 76.0 % Final     Lymphocyte %   Date Value Ref Range Status   2023 28.4 19.6 - 45.3 % Final     Monocyte %   Date Value Ref Range Status    08/11/2023 9.8 5.0 - 12.0 % Final     Eosinophil %   Date Value Ref Range Status   08/11/2023 4.6 0.3 - 6.2 % Final     Basophil %   Date Value Ref Range Status   08/11/2023 0.4 0.0 - 1.5 % Final     Immature Grans %   Date Value Ref Range Status   08/11/2023 0.4 0.0 - 0.5 % Final     Neutrophils, Absolute   Date Value Ref Range Status   08/11/2023 4.30 1.70 - 7.00 10*3/mm3 Final     Lymphocytes, Absolute   Date Value Ref Range Status   08/11/2023 2.17 0.70 - 3.10 10*3/mm3 Final     Monocytes, Absolute   Date Value Ref Range Status   08/11/2023 0.75 0.10 - 0.90 10*3/mm3 Final     Eosinophils, Absolute   Date Value Ref Range Status   08/11/2023 0.35 0.00 - 0.40 10*3/mm3 Final     Basophils, Absolute   Date Value Ref Range Status   08/11/2023 0.03 0.00 - 0.20 10*3/mm3 Final     Immature Grans, Absolute   Date Value Ref Range Status   08/11/2023 0.03 0.00 - 0.05 10*3/mm3 Final     nRBC   Date Value Ref Range Status   08/11/2023 0.0 0.0 - 0.2 /100 WBC Final        Result Review:  I have personally reviewed the results from the time of this admission to 8/12/2023 07:13 EDT and agree with these findings:  [x]  Laboratory  []  Microbiology  [x]  Radiology  []  EKG/Telemetry   []  Cardiology/Vascular   []  Pathology  []  Old records  []  Other:      Assessment & Plan   Assessment / Plan     Brief Patient Summary:  Anshul Meyer is a 78 y.o. male who is status post right cephalomedullary nailing of proximal femur fracture    Active Hospital Problems:  Active Hospital Problems    Diagnosis     **Closed 2-part intertrochanteric fracture of proximal end of right femur     Closed right hip fracture, initial encounter      Plan: Weightbearing as tolerated with walker  Physical and Occupational Therapy  Pain control  DVT prophylaxis  Discharge planning: Patient expressing desire to go home with home health possibly tomorrow.  We will see how he does with therapy.       DVT prophylaxis:  Medical and mechanical DVT prophylaxis  orders are present.    CODE STATUS:   Level Of Support Discussed With: Patient  Code Status (Patient has no pulse and is not breathing): CPR (Attempt to Resuscitate)  Medical Interventions (Patient has pulse or is breathing): Full Support    Disposition:  I expect patient to be discharged when medically able.    Electronically signed by Elias Colindres MD, 08/12/23, 7:13 AM EDT.

## 2023-08-12 NOTE — PROGRESS NOTES
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 Given this finding, orthopedics was consulted, hospitalized for further monitoring and management, status post supplement joint nailing of right proximal femur fracture    Interval follow-up: 2023    POD #1   Remains medically stable pain reasonably controlled   Doing well with therapy   No obvious postop issues   Eager to return home soon       Review of systems:  All systems reviewed and negative except for right hip pain    Objective   Objective     Vitals:   Temp:  [97.7 øF (36.5 øC)-98.5 øF (36.9 øC)] 97.8 øF (36.6 øC)  Heart Rate:  [65-93] 93  Resp:  [12-16] 16  BP: (105-164)/(62-91) 106/70  Flow (L/min):  [1.5-2] 2  Physical Exam               Constitutional: Awake, alert, no acute distress              Eyes: Pupils equal, sclerae anicteric, no conjunctival injection              HENT: NCAT, mucous membranes moist              Neck: Supple, full range of motion              Respiratory: Diminished to auscultation bilaterally, nonlabored respirations               Cardiovascular: RRR, no rubs, or gallops, palpable pedal pulses bilaterally              Gastrointestinal: Positive bowel sounds, soft, nontender, nondistended              Musculoskeletal: No bilateral ankle edema, right hip dressings intact.              Psychiatric: Appropriate affect, cooperative              Neurologic: Oriented x 3, strength symmetric in all extremities, speech clear              Skin: No rashes visible on exposed  skin    Result Review    Result Review:  I have personally reviewed the pertinent results from the past 24 hours to 8/12/2023 14:18 EDT and agree with these findings:  [x]  Laboratory   CBC          8/10/2023    14:27 8/11/2023    04:03 8/12/2023    04:10   CBC   WBC 8.50  7.63     RBC 5.04  4.79     Hemoglobin 15.7  15.0  13.8    Hematocrit 46.0  43.5  39.8    MCV 91.3  90.8     MCH 31.2  31.3     MCHC 34.1  34.5     RDW 12.5  12.2     Platelets 223  222       BMP          8/10/2023    14:27 8/11/2023    04:03   BMP   BUN 23  19    Creatinine 1.00  0.65    Sodium 139  136    Potassium 4.2  4.0    Chloride 103  102    CO2 27.6  25.2    Calcium 9.2  9.0      LIVER FUNCTION TESTS:      Lab 08/11/23  0403 08/10/23  1427   TOTAL PROTEIN 6.8 7.5   ALBUMIN 3.6 4.0   GLOBULIN  --  3.5   ALT (SGPT) 10 11   AST (SGOT) 12 13   BILIRUBIN 0.5 0.4   INDIRECT BILIRUBIN 0.3  --    BILIRUBIN DIRECT 0.2  --    ALK PHOS 71 81   LIPASE  --  26       [x]  Microbiology No results found for: ACANTHNAEG, AFBCX, BPERTUSSISCX, BLOODCX  No results found for: BCIDPCR, CXREFLEX, CSFCX, CULTURETIS  No results found for: CULTURES, HSVCX, URCX  No results found for: EYECULTURE, GCCX, HSVCULTURE, LABHSV  No results found for: LEGIONELLA, MRSACX, MUMPSCX, MYCOPLASCX  No results found for: NOCARDIACX, STOOLCX  No results found for: THROATCX, UNSTIMCULT, URINECX, CULTURE, VZVCULTUR  No results found for: VIRALCULTU, WOUNDCX    [x]  Radiology XR Chest 1 View    Result Date: 8/10/2023  PROCEDURE: XR CHEST 1 VW  COMPARISON: [facility], CR, CHEST PA/AP & LAT 2V, 8/03/2016, 19:05.  INDICATIONS: PRE-OP FOR HIP FRACTURE TODAY - NO CHEST COMPLAINTS  FINDINGS:  Heart size and pulmonary vessels normal.  Pleural calcifications noted on the left.  Scarring or chronic atelectasis in the lung bases.  No acute pulmonary abnormality demonstrated         1. No acute cardiopulmonary disease  2. Pleural calcifications and bibasilar scarring or  atelectasis       SPEEDY LOBO MD       Electronically Signed and Approved By: SPEEDY LOBO MD on 8/10/2023 at 14:33             FL Surgery Fluoro    Result Date: 8/11/2023  This procedure was auto-finalized with no dictation required.    XR Hip With or Without Pelvis 2 - 3 View Right    Result Date: 8/10/2023  PROCEDURE: XR HIP W OR WO PELVIS 2-3 VIEW RIGHT  COMPARISON: None  INDICATIONS: fall/injury with right hip pain and inability to bear weight on right leg  FINDINGS:  There is mild narrowing of the hip joint spaces bilaterally.  There is an acute intertrochanteric nondisplaced fracture of the right proximal femur.  Sacroiliac joints appear within normal limits.  No definite pelvic fracture.        1. Acute nondisplaced intertrochanteric fracture of the proximal right femur.      MEDHAT SCOTT MD       Electronically Signed and Approved By: MEDHAT SCOTT MD on 8/10/2023 at 13:29               []  EKG/Telemetry   []  Cardiology/Vascular   []  Pathology  [x]  Old records  []  Other:    Assessment & Plan   Assessment / Plan     Assessment:    Status post mechanical fall  Acute nondisplaced intertrochanteric fracture of the proximal right femur   Status post ORIF right femur fracture with short gamma nail 8/11/23 by Dr. Colindres  Current tobacco smoker  Bibasilar scarring, pleural calcifications     Plan:  Continue routine postop care.  Continue to mobilize.  Weightbearing as tolerated.  PT/OT  Disposition: Likely home with home health in 24 hours if continues to progress and remained medically stable  Dr. Colindres consulted, discussed with them, recommendations appreciated  Chest x-ray and EKG reviewed  Pain control with Norco 5/325 and 10/325 mg every 6 hours as needed for moderate-severe pain respectively  Morphine every 6 hours as needed 2 mg for severe breakthrough pain  Bowel regimen  A.m. labs  Full code  DVT prophylaxis with Eliquis  Clinical course dictate further management  Discussed with  nurse at the bedside    DVT prophylaxis:  Medical and mechanical DVT prophylaxis orders are present.    CODE STATUS:   Level Of Support Discussed With: Patient  Code Status (Patient has no pulse and is not breathing): CPR (Attempt to Resuscitate)  Medical Interventions (Patient has pulse or is breathing): Full Support         Attending documentation:  I reviewed the above documentation and independently reviewed and rounded and evaluated the patient and discussed the care plan with PAMELA Das PA-C, I agree with his findings and plan as documented, what I have added to the care plan and modified is as follows in my documentation and my medical decision making; 78-year-old male hospitalized on 8/10/2023 after suffering acute nondisplaced intertrochanteric fracture of the proximal right femur, orthopedics consulted, status post ORIF and short gamma nail repair.  Interval follow-up: Seen and examined, no acute distress, no acute major overnight events, pain is tolerable, remains afebrile, postprocedure hemoglobin 13.8.  Is been moving with therapy, still unsteady.  Review of systems obtained, all systems reviewed and negative except for generalized fatigue, generalized weakness, right hip pain.  Vitals reviewed labs reviewed on physical exam elderly appearing male, sitting in the bedside chair, no acute distress, regular rate rhythm, clear to auscultation bilaterally, soft nontender abdomen, no lower extremity edema, right hip the lateral side has dressings covered with Tegaderm, no bleedthrough.  Distal extremity neurovascular intact.  Assessment as above, plan is to continue mobilization with PT and OT to ensure safety, patient does live alone, will need home health set up, orthopedic recommendations appreciated, pain control, a.m. labs, full code, DVT prophylaxis with Eliquis, clinical course dictate further management, discussed with nurse at bedside.  More than 51% of the time of this patient's encounter was  performed by me, this included face-to-face time, planning and coordinating, medical decision making and critical thinking personally done by me.    Electronically signed by Mariam Cartagena MD, 08/12/23, 3:15 PM EDT.  Portions of this documentation were transcribed electronically from a voice recognition software.  I confirm all data accurately represents the service(s) I performed at today's visit.

## 2023-08-12 NOTE — THERAPY EVALUATION
Acute Care - Physical Therapy Initial Evaluation   Gautam     Patient Name: Anshul Meyer  : 1945  MRN: [id number]  Today's Date: 2023   Onset of Illness/Injury or Date of Surgery: 08/10/23  Visit Dx:     ICD-10-CM ICD-9-CM   1. Closed nondisplaced intertrochanteric fracture of right femur, initial encounter  S72.144A 820.21   2. Closed 2-part intertrochanteric fracture of right femur, initial encounter  S72.141A 820.21   3. Right knee pain, unspecified chronicity  M25.561 719.46   4. Skin tear of right upper arm without complication, initial encounter  S41.111A 880.03   5. Difficulty in walking  R26.2 719.7     Patient Active Problem List   Diagnosis    Closed 2-part intertrochanteric fracture of proximal end of right femur    Closed right hip fracture, initial encounter     History reviewed. No pertinent past medical history.  Past Surgical History:   Procedure Laterality Date    FEMUR IM NAILING/RODDING Right 2023    Procedure: FEMUR INTRAMEDULLARY NAILING/RODDING;  Surgeon: Elias Colindres MD;  Location: [facility] MAIN OR;  Service: Orthopedics;  Laterality: Right;     PT Assessment (last 12 hours)       PT Evaluation and Treatment       Row Name 23 0916          Physical Therapy Time and Intention    Subjective Information no complaints  -DW     Document Type evaluation  -DW     Mode of Treatment physical therapy  -DW     Patient Effort excellent  -DW     Symptoms Noted During/After Treatment none  -DW       Row Name 23 0916          General Information    Patient Profile Reviewed yes  -DW     Onset of Illness/Injury or Date of Surgery 08/10/23  -DW     Referring Physician Mitzi Becerra  -DW     Prior Level of Function independent:;all household mobility;community mobility;ADL's  -DW     Equipment Currently Used at Home walker, rolling  -DW     Risks Reviewed patient:  -DW     Benefits Reviewed patient:;improve function;increase independence;increase strength;increase balance   -DW     Barriers to Rehab none identified  -       Row Name 08/12/23 0916          Previous Level of Function/Home Environm    BADLs, Premorbid Functional Level independent  -DW     IADLs, Premorbid Functional Level independent  -DW     Bed Mobility, Premorbid Functional Level independent  -DW     Transfers, Premorbid Functional Level independent  -DW     Household Ambulation, Premorbid Functional Level independent;uses device or equipment  -DW     Stairs, Premorbid Functional Level independent;uses device or equipment  -DW     Community Ambulation, Premorbid Functional Level independent;uses device or equipment  -       Row Name 08/12/23 0916          Living Environment    Current Living Arrangements home  -DW     People in Home spouse  -DW     Primary Care Provided by self  -       Row Name 08/12/23 0916          Home Use of Assistive/Adaptive Equipment    Equipment Currently Used at Home walker, rolling;cane, quad tip  -       Row Name 08/12/23 0916          Pain    Pretreatment Pain Rating 2/10  -     Pain Location - Side/Orientation Right  -       Row Name 08/12/23 0916          Cognition    Affect/Mental Status (Cognition) WNL  -DW     Orientation Status (Cognition) oriented x 3  -DW     Follows Commands (Cognition) WNL  -     Cognitive Function WNL  -DW       Row Name 08/12/23 0916          Range of Motion Comprehensive    General Range of Motion lower extremity range of motion deficits identified  -     Comment, General Range of Motion Decreased R hip mobility due to recent surgery.  -       Row Name 08/12/23 0916          Strength Comprehensive (MMT)    General Manual Muscle Testing (MMT) Assessment lower extremity strength deficits identified  -     Comment, General Manual Muscle Testing (MMT) Assessment R 3+/5; L 5/5  -       Row Name 08/12/23 0916          Bed Mobility    Bed Mobility other (see comments)  out of bed in bathroom on arrival  -       Row Name 08/12/23 0916           Transfers    Transfers stand-sit transfer  -DW       Row Name 08/12/23 0916          Stand-Sit Transfer    Stand-Sit [place] (Transfers) standby assist  -DW     Assistive Device (Stand-Sit Transfers) walker, front-wheeled  -DW       Row Name 08/12/23 0916          Gait/Stairs (Locomotion)    Gait/Stairs Locomotion gait/ambulation independence;gait/ambulation assistive device;distance ambulated  -DW     [place] Level (Gait) standby assist  -DW     Assistive Device (Gait) walker, front-wheeled  -DW     Distance in Feet (Gait) 250  -DW     Deviations/Abnormal Patterns (Gait) stride length decreased;gait speed decreased  -DW       Row Name 08/12/23 0916          Safety Issues, Functional Mobility    Impairments Affecting Function (Mobility) balance;endurance/activity tolerance;strength  -DW       Row Name 08/12/23 0916          Balance    Balance Assessment standing dynamic balance  -DW     Dynamic Standing Balance standby assist  -DW     Position/Device Used, Standing Balance walker, rolling  -DW       Row Name             Wound 08/10/23 1845 Right lateral elbow Skin Tear    Wound - Properties Group Placement Date: 08/10/23  -KH Placement Time: 1845 -KH Present on Hospital Admission: Y  -KH Side: Right  -KH Orientation: lateral  -KH Location: elbow  -KH Primary Wound Type: Skin tear  -KH    Retired Wound - Properties Group Placement Date: 08/10/23  -KH Placement Time: 1845 -KH Present on Hospital Admission: Y  -KH Side: Right  -KH Orientation: lateral  -KH Location: elbow  -KH Primary Wound Type: Skin tear  -KH    Retired Wound - Properties Group Date first assessed: 08/10/23  -KH Time first assessed: 1845 -KH Present on Hospital Admission: Y  -KH Side: Right  -KH Location: elbow  -KH Primary Wound Type: Skin tear  -KH      Row Name             Wound 08/11/23 1429 Right anterior greater trochanter Incision    Wound - Properties Group Placement Date: 08/11/23  -LD Placement Time: 1429  -LD Side: Right  -LD  Orientation: anterior  -LD Location: greater trochanter  -LD Primary Wound Type: Incision  -LD    Retired Wound - Properties Group Placement Date: 08/11/23  -LD Placement Time: 1429 -LD Side: Right  -LD Orientation: anterior  -LD Location: greater trochanter  -LD Primary Wound Type: Incision  -LD    Retired Wound - Properties Group Date first assessed: 08/11/23  -LD Time first assessed: 1429  -LD Side: Right  -LD Location: greater trochanter  -LD Primary Wound Type: Incision  -LD      Row Name 08/12/23 0916          Plan of Care Review    Plan of Care Reviewed With patient  -DW     Progress improving  -DW     Outcome Evaluation Pt presents with decreased functional mobility due to recent surgery.  Presence of decreased strength and ROM contribute to decreased functional mobility.  skilled PT intervention is needed to address noted deficits to restore to PLOF  -DW       Row Name 08/12/23 0916          Therapy Assessment/Plan (PT)    PT Diagnosis (PT) Difficulty in walking  -DW     Rehab Potential (PT) good, to achieve stated therapy goals  -DW     Criteria for Skilled Interventions Met (PT) yes;meets criteria;skilled treatment is necessary  -DW     Therapy Frequency (PT) daily  -DW     Problem List (PT) problems related to;balance;strength  -DW     Activity Limitations Related to Problem List (PT) unable to ambulate safely;unable to transfer safely;BADLs not performed adequately or safely;IADLs not performed adequately or safely  -DW       Row Name 08/12/23 0916          PT Evaluation Complexity    History, PT Evaluation Complexity no personal factors and/or comorbidities  -DW     Examination of Body Systems (PT Eval Complexity) 1-2 elements  -DW     Clinical Presentation (PT Evaluation Complexity) stable  -DW     Clinical Decision Making (PT Evaluation Complexity) low complexity  -DW     Overall Complexity (PT Evaluation Complexity) low complexity  -DW       Row Name 08/12/23 0916          Therapy Plan  Review/Discharge Plan (PT)    Therapy Plan Review (PT) evaluation/treatment results reviewed  -DW       Row Name 08/12/23 0916          Physical Therapy Goals    Bed Mobility Goal Selection (PT) bed mobility, PT goal 1  -DW     Transfer Goal Selection (PT) transfer, PT goal 1  -DW     Gait Training Goal Selection (PT) gait training, PT goal 1  -DW       Row Name 08/12/23 0916          Bed Mobility Goal 1 (PT)    Activity/Assistive Device (Bed Mobility Goal 1, PT) bed mobility activities, all  -DW     [place] Level/Cues Needed (Bed Mobility Goal 1, PT) independent  -DW     Time Frame (Bed Mobility Goal 1, PT) long term goal (LTG);10 days  -DW       Row Name 08/12/23 0916          Transfer Goal 1 (PT)    Activity/Assistive Device (Transfer Goal 1, PT) transfers, all  -DW     [place] Level/Cues Needed (Transfer Goal 1, PT) independent  -DW     Time Frame (Transfer Goal 1, PT) long term goal (LTG);10 days  -DW       Row Name 08/12/23 0916          Gait Training Goal 1 (PT)    Activity/Assistive Device (Gait Training Goal 1, PT) gait (walking locomotion);assistive device use  -DW     [place] Level (Gait Training Goal 1, PT) independent  -DW     Distance (Gait Training Goal 1, PT) 350  -DW     Time Frame (Gait Training Goal 1, PT) long term goal (LTG);10 days  -DW               User Key  (r) = Recorded By, (t) = Taken By, (c) = Cosigned By      Initials Name Provider Type    Linette Bass, RN Registered Nurse    Brendan Mirza, PT Physical Therapist    Marilu Ortega, RN Registered Nurse                    Physical Therapy Education       Title: PT OT SLP Therapies (Done)       Topic: Physical Therapy (Done)       Point: Mobility training (Done)       Learning Progress Summary             Patient Acceptance, E,TB, VU by KHURRAM at 8/12/2023 0930                         Point: Home exercise program (Done)       Learning Progress Summary             Patient Acceptance, E,TB, VU by KHURRAM at 8/12/2023 0930                          Point: Body mechanics (Done)       Learning Progress Summary             Patient Acceptance, E,TB, VU by KHURRAM at 8/12/2023 0930                         Point: Precautions (Done)       Learning Progress Summary             Patient Acceptance, E,TB, VU by KHURRAM at 8/12/2023 0930                                         User Key       Initials Effective Dates Name Provider Type Discipline     04/25/21 -  Brendan Poole, BERRY Physical Therapist PT                  PT Recommendation and Plan  Anticipated Discharge Disposition (PT): home with home health  Planned Therapy Interventions (PT): balance training, gait training, strengthening, transfer training  Therapy Frequency (PT): daily  Plan of Care Reviewed With: patient  Progress: improving  Outcome Evaluation: Pt presents with decreased functional mobility due to recent surgery.  Presence of decreased strength and ROM contribute to decreased functional mobility.  skilled PT intervention is needed to address noted deficits to restore to PLOF   Outcome Measures       Row Name 08/12/23 0929             How much help from another person do you currently need...    Turning from your back to your side while in flat bed without using bedrails? 3  -DW      Moving from lying on back to sitting on the side of a flat bed without bedrails? 3  -DW      Moving to and from a bed to a chair (including a wheelchair)? 3  -DW      Standing up from a chair using your arms (e.g., wheelchair, bedside chair)? 3  -DW      Climbing 3-5 steps with a railing? 2  -DW      To walk in hospital room? 2  -DW      AM-PAC 6 Clicks Score (PT) 16  -DW         Functional Assessment    Outcome Measure Options AM-PAC 6 Clicks Basic Mobility (PT)  -DW                User Key  (r) = Recorded By, (t) = Taken By, (c) = Cosigned By      Initials Name Provider Type    Brendan Mirza PT Physical Therapist                     Time Calculation:    PT Charges       Row Name 08/12/23 0930              Time Calculation    PT Received On 08/12/23  -DW      PT Goal Re-Cert Due Date 08/21/23  -DW         Timed Charges    30490 - Gait Training Minutes  8  -DW         Untimed Charges    PT Eval/Re-eval Minutes 15  -DW         Total Minutes    Timed Charges Total Minutes 8  -DW      Untimed Charges Total Minutes 15  -DW       Total Minutes 23  -DW                User Key  (r) = Recorded By, (t) = Taken By, (c) = Cosigned By      Initials Name Provider Type     Brendan Poole, PT Physical Therapist                  Therapy Charges for Today       Code Description Service Date Service Provider Modifiers Qty    31324074796 HC GAIT TRAINING EA 15 MIN 8/12/2023 Brendan Poole, PT GP 1    68285717785 HC PT EVAL LOW COMPLEXITY 2 8/12/2023 Brendan Poole, PT GP 1            PT G-Codes  Outcome Measure Options: AM-PAC 6 Clicks Basic Mobility (PT)  AM-PAC 6 Clicks Score (PT): 16    Brendan Poole PT  8/12/2023

## 2023-08-12 NOTE — PLAN OF CARE
Goal Outcome Evaluation:  Plan of Care Reviewed With: patient        Progress: no change  Outcome Evaluation: Patient presents with balance and endurance limitations that impede his/her ability to perform ADLS. The skills of a therapist are necessary to maximize independence with ADLs.      Anticipated Discharge Disposition (OT): home with assist, home with home health

## 2023-08-12 NOTE — NURSING NOTE
Patient up with 1 x assist and walker, medicated for pain R hip-see eMAR for medication administration details. Patient voids per urinal. No changes in patient status, patient has remained stable throughout shift.

## 2023-08-12 NOTE — THERAPY EVALUATION
Patient Name: Anshul Meyer  : 1945    MRN: [id number]                              Today's Date: 2023       Admit Date: 8/10/2023    Visit Dx:     ICD-10-CM ICD-9-CM   1. Closed nondisplaced intertrochanteric fracture of right femur, initial encounter  S72.144A 820.21   2. Closed 2-part intertrochanteric fracture of right femur, initial encounter  S72.141A 820.21   3. Right knee pain, unspecified chronicity  M25.561 719.46   4. Skin tear of right upper arm without complication, initial encounter  S41.111A 880.03   5. Difficulty in walking  R26.2 719.7   6. Impaired mobility and ADLs  Z74.09 V49.89    Z78.9      Patient Active Problem List   Diagnosis    Closed 2-part intertrochanteric fracture of proximal end of right femur    Closed right hip fracture, initial encounter     History reviewed. No pertinent past medical history.  Past Surgical History:   Procedure Laterality Date    FEMUR IM NAILING/RODDING Right 2023    Procedure: FEMUR INTRAMEDULLARY NAILING/RODDING;  Surgeon: Elias Colindres MD;  Location: [facility] MAIN OR;  Service: Orthopedics;  Laterality: Right;      General Information       Row Name 23 1035          OT Time and Intention    Document Type evaluation  -AC     Mode of Treatment individual therapy;occupational therapy  -AC       Row Name 23 1035          General Information    Patient Profile Reviewed yes  -AC     Prior Level of Function independent:;all household mobility;community mobility;ADL's  Patient states he has a tub shower for bathing and a regular toilet in place.  -AC     Existing Precautions/Restrictions fall;weight bearing  -AC     Barriers to Rehab none identified  -AC       Row Name 23 1035          Occupational Profile    Reason for Services/Referral (Occupational Profile) Pt. is a 78year old male admitted for the above diagnosis status post right femur nailing on 2023. Pt. referred to OT services to assess independence with ADLs and  adl transfers/fx'l mobility. No previous OT services for current condition.  -       Row Name 08/12/23 1035          Living Environment    People in Home spouse  -       Row Name 08/12/23 1035          Cognition    Orientation Status (Cognition) oriented x 3  -       Row Name 08/12/23 1035          Safety Issues, Functional Mobility    Impairments Affecting Function (Mobility) balance;endurance/activity tolerance;strength  -               User Key  (r) = Recorded By, (t) = Taken By, (c) = Cosigned By      Initials Name Provider Type    La Elizabeth OT Occupational Therapist                     Mobility/ADL's       Row Name 08/12/23 1037          Bed Mobility    Comment, (Bed Mobility) Patient seated in recliner upon OT entering the room.  -       Row Name 08/12/23 1037          Transfers    Transfers sit-stand transfer  -       Row Name 08/12/23 1037          Sit-Stand Transfer    Sit-Stand [place] (Transfers) standby assist;1 person assist;verbal cues  -     Assistive Device (Sit-Stand Transfers) walker, front-wheeled  -       Row Name 08/12/23 1037          Functional Mobility    Functional Mobility- Ind. Level standby assist;verbal cues required;1 person  -     Functional Mobility- Device walker, front-wheeled  -       Row Name 08/12/23 1037          Activities of Daily Living    BADL Assessment/Intervention --  Patient is set up for self-feeding, set up for grooming, set up for upper body bathing/dressing, min assist for lower body bathing/dressing, standby assist for toileting.  -       Row Name 08/12/23 1037          Mobility    Extremity Weight-bearing Status right lower extremity  -     Right Lower Extremity (Weight-bearing Status) weight-bearing as tolerated (WBAT)  -               User Key  (r) = Recorded By, (t) = Taken By, (c) = Cosigned By      Initials Name Provider Type    La Elizabeth OT Occupational Therapist                   Obj/Interventions       Row Name  08/12/23 1038          Sensory Assessment (Somatosensory)    Sensory Assessment (Somatosensory) sensation intact  -AC       Row Name 08/12/23 1038          Vision Assessment/Intervention    Visual Impairment/Limitations WFL  -[facility] 08/12/23 1038          Range of Motion Comprehensive    General Range of Motion bilateral upper extremity ROM WNL  -AC       Row Name 08/12/23 1038          Strength Comprehensive (MMT)    General Manual Muscle Testing (MMT) Assessment no strength deficits identified  -AC       Row Name 08/12/23 1038          Motor Skills    Motor Skills coordination;functional endurance  -     Coordination WFL  [facility]     Functional Endurance Fair plus for ADLs  -AC       Row Name 08/12/23 1038          Balance    Balance Assessment standing dynamic balance  -     Dynamic Standing Balance standby assist;1-person assist;verbal cues  -     Position/Device Used, Standing Balance supported;walker, front-wheeled  -     Balance Interventions standing;sit to stand;supported;dynamic;minimal challenge;occupation based/functional task  -               User Key  (r) = Recorded By, (t) = Taken By, (c) = Cosigned By      Initials Name Provider Type     La Ramirez OT Occupational Therapist                   Goals/Plan       Row Name 08/12/23 1040          Bed Mobility Goal 1 (OT)    Activity/Assistive Device (Bed Mobility Goal 1, OT) bed mobility activities, all  -AC     [place] Level/Cues Needed (Bed Mobility Goal 1, OT) modified independence  -AC     Time Frame (Bed Mobility Goal 1, OT) long term goal (LTG);10 days  -AC       Row Name 08/12/23 1040          Transfer Goal 1 (OT)    Activity/Assistive Device (Transfer Goal 1, OT) transfers, all  -AC     [place] Level/Cues Needed (Transfer Goal 1, OT) modified independence  -AC     Time Frame (Transfer Goal 1, OT) long term goal (LTG);10 days  -AC       Row Name 08/12/23 1040          Bathing Goal 1 (OT)    Activity/Device (Bathing  Goal 1, OT) bathing skills, all  -AC     [place] Level/Cues Needed (Bathing Goal 1, OT) modified independence  -AC     Time Frame (Bathing Goal 1, OT) long term goal (LTG);10 days  -AC       Row Name 08/12/23 1040          Dressing Goal 1 (OT)    Activity/Device (Dressing Goal 1, OT) dressing skills, all  -AC     [place]/Cues Needed (Dressing Goal 1, OT) modified independence  -AC     Time Frame (Dressing Goal 1, OT) long term goal (LTG);10 days  -AC       Row Name 08/12/23 1040          Toileting Goal 1 (OT)    Activity/Device (Toileting Goal 1, OT) toileting skills, all  -AC     [place] Level/Cues Needed (Toileting Goal 1, OT) modified independence  -AC     Time Frame (Toileting Goal 1, OT) long term goal (LTG);10 days  -AC       Row Name 08/12/23 1040          Problem Specific Goal 1 (OT)    Problem Specific Goal 1 (OT) Patient will demonstrate good activity tolerance for ADLs.  -AC     Time Frame (Problem Specific Goal 1, OT) long term goal (LTG);10 days  -AC       Row Name 08/12/23 1040          Therapy Assessment/Plan (OT)    Planned Therapy Interventions (OT) activity tolerance training;functional balance retraining;occupation/activity based interventions;ROM/therapeutic exercise;transfer/mobility retraining;patient/caregiver education/training;BADL retraining  -               User Key  (r) = Recorded By, (t) = Taken By, (c) = Cosigned By      Initials Name Provider Type    AC La Ramirez OT Occupational Therapist                   Clinical Impression       Row Name 08/12/23 1039          Pain Assessment    Pretreatment Pain Rating 0/10 - no pain  -AC     Posttreatment Pain Rating 0/10 - no pain  -AC       Row Name 08/12/23 1039          Plan of Care Review    Plan of Care Reviewed With patient  -AC     Progress no change  -     Outcome Evaluation Patient presents with balance and endurance limitations that impede his/her ability to perform ADLS. The skills of a therapist are necessary  to maximize independence with ADLs.  -       Row Name 08/12/23 1039          Therapy Assessment/Plan (OT)    Patient/Family Therapy Goal Statement (OT) Patient would like to maximize independence with adls.  -AC     Rehab Potential (OT) good, to achieve stated therapy goals  -     Criteria for Skilled Therapeutic Interventions Met (OT) yes;meets criteria;skilled treatment is necessary  -     Therapy Frequency (OT) 5 times/wk  -       Row Name 08/12/23 1039          Therapy Plan Review/Discharge Plan (OT)    Equipment Needs Upon Discharge (OT) walker, rolling;commode chair  -AC     Anticipated Discharge Disposition (OT) home with assist;home with home health  -       Row Name 08/12/23 1039          Positioning and Restraints    Pre-Treatment Position sitting in chair/recliner  -AC     Post Treatment Position chair  -AC     In Chair call light within reach;encouraged to call for assist;exit alarm on;reclined;legs elevated  -               User Key  (r) = Recorded By, (t) = Taken By, (c) = Cosigned By      Initials Name Provider Type     La Ramirez, IDRIS Occupational Therapist                   Outcome Measures       Row Name 08/12/23 1040          How much help from another is currently needed...    Putting on and taking off regular lower body clothing? 3  -AC     Bathing (including washing, rinsing, and drying) 3  -AC     Toileting (which includes using toilet bed pan or urinal) 3  -AC     Putting on and taking off regular upper body clothing 4  -AC     Taking care of personal grooming (such as brushing teeth) 4  -AC     Eating meals 4  -AC     AM-PAC 6 Clicks Score (OT) 21  -       Row Name 08/12/23 0929 08/12/23 0912       How much help from another person do you currently need...    Turning from your back to your side while in flat bed without using bedrails? 3  -DW 3  -TW    Moving from lying on back to sitting on the side of a flat bed without bedrails? 3  -DW 3  -TW    Moving to and from a bed  to a chair (including a wheelchair)? 3  -DW 3  -TW    Standing up from a chair using your arms (e.g., wheelchair, bedside chair)? 3  -DW 3  -TW    Climbing 3-5 steps with a railing? 2  -DW 2  -TW    To walk in hospital room? 2  -DW 2  -TW    AM-PAC 6 Clicks Score (PT) 16  -DW 16  -TW    Highest level of mobility 5 --> Static standing  -DW 5 --> Static standing  -TW      Row Name 08/12/23 1040 08/12/23 0929       Functional Assessment    Outcome Measure Options AM-PAC 6 Clicks Daily Activity (OT);Optimal Instrument  -AC AM-PAC 6 Clicks Basic Mobility (PT)  -DW      Row Name 08/12/23 1040          Optimal Instrument    Optimal Instrument Optimal - 3  -AC     Bending/Stooping 2  -AC     Standing 2  -AC     Reaching 1  -AC     From the list, choose the 3 activities you would most like to be able to do without any difficulty Bending/stooping;Standing;Reaching  -AC     Total Score Optimal - 3 5  -AC               User Key  (r) = Recorded By, (t) = Taken By, (c) = Cosigned By      Initials Name Provider Type    AC La Ramirez, OT Occupational Therapist    DW Brendan Poole, PT Physical Therapist    TW Paty Flanagan, RN Registered Nurse                      OT Recommendation and Plan  Planned Therapy Interventions (OT): activity tolerance training, functional balance retraining, occupation/activity based interventions, ROM/therapeutic exercise, transfer/mobility retraining, patient/caregiver education/training, BADL retraining  Therapy Frequency (OT): 5 times/wk  Plan of Care Review  Plan of Care Reviewed With: patient  Progress: no change  Outcome Evaluation: Patient presents with balance and endurance limitations that impede his/her ability to perform ADLS. The skills of a therapist are necessary to maximize independence with ADLs.     Time Calculation:   Evaluation Complexity (OT)  Review Occupational Profile/Medical/Therapy History Complexity: expanded/moderate complexity  Clinical Decision Making Complexity (OT):  problem focused assessment/low complexity  Overall Complexity of Evaluation (OT): low complexity     Time Calculation- OT       Row Name 08/12/23 1041 08/12/23 0932          Time Calculation- OT    OT Received On 08/12/23  -AC --     OT Goal Re-Cert Due Date 08/21/23  -AC --        Timed Charges    14249 - Gait Training Minutes  -- 8  -DW        Untimed Charges    OT Eval/Re-eval Minutes 32  -AC --        Total Minutes    Timed Charges Total Minutes -- 8  -DW     Untimed Charges Total Minutes 32  -AC --      Total Minutes 32  -AC 8  -DW               User Key  (r) = Recorded By, (t) = Taken By, (c) = Cosigned By      Initials Name Provider Type    AC La Ramirez, OT Occupational Therapist    DW Brendan Poole, PT Physical Therapist                  Therapy Charges for Today       Code Description Service Date Service Provider Modifiers Qty    [id number] HC OT EVAL LOW COMPLEXITY 3 8/12/2023 La Ramirez OT GO 1                 La Ramirez OT  8/12/2023

## 2023-08-13 ENCOUNTER — READMISSION MANAGEMENT (OUTPATIENT)
Dept: CALL CENTER | Facility: HOSPITAL | Age: 78
End: 2023-08-13
Payer: MEDICARE

## 2023-08-13 ENCOUNTER — NURSE TRIAGE (OUTPATIENT)
Dept: CALL CENTER | Facility: HOSPITAL | Age: 78
End: 2023-08-13
Payer: MEDICARE

## 2023-08-13 VITALS
WEIGHT: 194 LBS | RESPIRATION RATE: 16 BRPM | HEIGHT: 75 IN | SYSTOLIC BLOOD PRESSURE: 121 MMHG | HEART RATE: 85 BPM | BODY MASS INDEX: 24.12 KG/M2 | TEMPERATURE: 97.8 F | DIASTOLIC BLOOD PRESSURE: 69 MMHG | OXYGEN SATURATION: 95 %

## 2023-08-13 PROCEDURE — 94799 UNLISTED PULMONARY SVC/PX: CPT

## 2023-08-13 PROCEDURE — 97530 THERAPEUTIC ACTIVITIES: CPT

## 2023-08-13 PROCEDURE — 97116 GAIT TRAINING THERAPY: CPT

## 2023-08-13 PROCEDURE — 97110 THERAPEUTIC EXERCISES: CPT

## 2023-08-13 RX ORDER — AMOXICILLIN 250 MG
1 CAPSULE ORAL 2 TIMES DAILY PRN
Qty: 30 TABLET | Refills: 0 | Status: SHIPPED | OUTPATIENT
Start: 2023-08-13 | End: 2023-08-13 | Stop reason: SDUPTHER

## 2023-08-13 RX ORDER — AMOXICILLIN 250 MG
1 CAPSULE ORAL 2 TIMES DAILY PRN
Qty: 30 TABLET | Refills: 0 | Status: SHIPPED | OUTPATIENT
Start: 2023-08-13

## 2023-08-13 RX ORDER — ASPIRIN 325 MG
325 TABLET, DELAYED RELEASE (ENTERIC COATED) ORAL DAILY
Qty: 14 TABLET | Refills: 0 | Status: SHIPPED | OUTPATIENT
Start: 2023-08-28 | End: 2023-08-13 | Stop reason: SDUPTHER

## 2023-08-13 RX ORDER — HYDROCODONE BITARTRATE AND ACETAMINOPHEN 7.5; 325 MG/1; MG/1
1 TABLET ORAL EVERY 4 HOURS PRN
Qty: 30 TABLET | Refills: 0 | Status: SHIPPED | OUTPATIENT
Start: 2023-08-13

## 2023-08-13 RX ORDER — HYDROCODONE BITARTRATE AND ACETAMINOPHEN 7.5; 325 MG/1; MG/1
1 TABLET ORAL EVERY 4 HOURS PRN
Qty: 30 TABLET | Refills: 0 | Status: SHIPPED | OUTPATIENT
Start: 2023-08-13 | End: 2023-08-24 | Stop reason: SDUPTHER

## 2023-08-13 RX ORDER — ASPIRIN 325 MG
325 TABLET, DELAYED RELEASE (ENTERIC COATED) ORAL DAILY
Qty: 14 TABLET | Refills: 0 | Status: SHIPPED | OUTPATIENT
Start: 2023-08-28

## 2023-08-13 RX ADMIN — HYDROCODONE BITARTRATE AND ACETAMINOPHEN 1 TABLET: 7.5; 325 TABLET ORAL at 10:46

## 2023-08-13 RX ADMIN — HYDROCODONE BITARTRATE AND ACETAMINOPHEN 1 TABLET: 7.5; 325 TABLET ORAL at 02:46

## 2023-08-13 RX ADMIN — APIXABAN 2.5 MG: 2.5 TABLET, FILM COATED ORAL at 09:05

## 2023-08-13 RX ADMIN — HYDROCODONE BITARTRATE AND ACETAMINOPHEN 1 TABLET: 7.5; 325 TABLET ORAL at 06:45

## 2023-08-13 RX ADMIN — Medication 10 ML: at 09:05

## 2023-08-13 NOTE — NURSING NOTE
Patient provided contact number for his significant other Tiffanie Brody at [phone], may be reached if he does not answer his phone for update regarding coordination of [facility] services.

## 2023-08-13 NOTE — OUTREACH NOTE
Prep Survey      Flowsheet Row Responses   [facility] [place] facility patient discharged from? [facility]   Is LACE score < 7 ? Yes   Eligibility Not Eligible   What are the reasons patient is not eligible? Other  [low risk for readmit]   Does the patient have one of the following disease processes/diagnoses(primary or secondary)? Other   Prep survey completed? Yes            Flaquita BRADFORD - Registered Nurse

## 2023-08-13 NOTE — PROGRESS NOTES
[facility]     Progress Note    Patient Name: Anshul Meyer  : 1945  MRN: [id number]  Primary Care Physician:  Provider, No Known  Date of admission: 8/10/2023    Subjective   Subjective     HPI:  Patient Reports doing well this morning.  He is sitting in the chair and has gotten dressed in normal closed.  He is anxious for discharge.    Review of Systems   See HPI    Objective   Objective     Vitals:   Temp:  [97.5 øF (36.4 øC)-99 øF (37.2 øC)] 98.1 øF (36.7 øC)  Heart Rate:  [] 85  Resp:  [16] 16  BP: (106-123)/(62-77) 110/62  Physical Exam    General: Alert, no acute distress   Chest: Unlabored breathing, cardiovascular: Regular heart rate   Musculoskeletal: Neurovascular intact extremity.  Positive pulses.  Dressings intact.  Negative Mari.    Result Review      WBC   Date Value Ref Range Status   2023 7.63 3.40 - 10.80 10*3/mm3 Final     RBC   Date Value Ref Range Status   2023 4.79 4.14 - 5.80 10*6/mm3 Final     Hemoglobin   Date Value Ref Range Status   2023 13.8 13.0 - 17.7 g/dL Final     Hematocrit   Date Value Ref Range Status   2023 39.8 37.5 - 51.0 % Final     MCV   Date Value Ref Range Status   2023 90.8 79.0 - 97.0 fL Final     MCH   Date Value Ref Range Status   2023 31.3 26.6 - 33.0 pg Final     MCHC   Date Value Ref Range Status   2023 34.5 31.5 - 35.7 g/dL Final     RDW   Date Value Ref Range Status   2023 12.2 (L) 12.3 - 15.4 % Final     RDW-SD   Date Value Ref Range Status   2023 40.9 37.0 - 54.0 fl Final     MPV   Date Value Ref Range Status   2023 9.9 6.0 - 12.0 fL Final     Platelets   Date Value Ref Range Status   2023 222 140 - 450 10*3/mm3 Final     Neutrophil %   Date Value Ref Range Status   2023 56.4 42.7 - 76.0 % Final     Lymphocyte %   Date Value Ref Range Status   2023 28.4 19.6 - 45.3 % Final     Monocyte %   Date Value Ref Range Status   2023 9.8 5.0 - 12.0 % Final     Eosinophil  %   Date Value Ref Range Status   08/11/2023 4.6 0.3 - 6.2 % Final     Basophil %   Date Value Ref Range Status   08/11/2023 0.4 0.0 - 1.5 % Final     Immature Grans %   Date Value Ref Range Status   08/11/2023 0.4 0.0 - 0.5 % Final     Neutrophils, Absolute   Date Value Ref Range Status   08/11/2023 4.30 1.70 - 7.00 10*3/mm3 Final     Lymphocytes, Absolute   Date Value Ref Range Status   08/11/2023 2.17 0.70 - 3.10 10*3/mm3 Final     Monocytes, Absolute   Date Value Ref Range Status   08/11/2023 0.75 0.10 - 0.90 10*3/mm3 Final     Eosinophils, Absolute   Date Value Ref Range Status   08/11/2023 0.35 0.00 - 0.40 10*3/mm3 Final     Basophils, Absolute   Date Value Ref Range Status   08/11/2023 0.03 0.00 - 0.20 10*3/mm3 Final     Immature Grans, Absolute   Date Value Ref Range Status   08/11/2023 0.03 0.00 - 0.05 10*3/mm3 Final     nRBC   Date Value Ref Range Status   08/11/2023 0.0 0.0 - 0.2 /100 WBC Final        Result Review:  I have personally reviewed the results from the time of this admission to 8/13/2023 08:39 EDT and agree with these findings:  []  Laboratory  []  Microbiology  [x]  Radiology  []  EKG/Telemetry   []  Cardiology/Vascular   []  Pathology  []  Old records  []  Other:      Assessment & Plan   Assessment / Plan     Brief Patient Summary:  Anshul Meyer is a 78 y.o. male who is status post aforementioned nailing right proximal femur fracture    Active Hospital Problems:  Active Hospital Problems    Diagnosis     **Closed 2-part intertrochanteric fracture of proximal end of right femur     Closed right hip fracture, initial encounter      Plan: Weightbearing as tolerated with walker  Physical and Occupational Therapy  Pain control  DVT prophylaxis  Discharge planning: Home with home health services, hopefully later today       DVT prophylaxis:  Medical and mechanical DVT prophylaxis orders are present.    CODE STATUS:   Level Of Support Discussed With: Patient  Code Status (Patient has no pulse and  is not breathing): CPR (Attempt to Resuscitate)  Medical Interventions (Patient has pulse or is breathing): Full Support    Disposition:  I expect patient to be discharged to inpatient rehab when able.    Electronically signed by Elias Colindres MD, 08/13/23, 8:39 AM EDT.

## 2023-08-13 NOTE — PLAN OF CARE
Goal Outcome Evaluation:  Plan of Care Reviewed With: patient        Progress: improving  Outcome Evaluation: pt medicated x2 for right hip pain with voiced relief. pt voiding per urinal. no changes in pt's status.

## 2023-08-13 NOTE — DISCHARGE SUMMARY
[facility]  HOSPITALIST  DISCHARGE SUMMARY       Patient Name: Anshul Meyer  : 1945  MRN: [id number]  Primary Care Physician: Cole, No Known    Date of Admission: 8/10/2023  Date of Discharge: 2023    Discharge Diagnoses   Status post mechanical fall  Acute nondisplaced intertrochanteric fracture of the proximal right femur   Status post ORIF right femur fracture with short gamma nail 23 by Dr. Colindres  Current tobacco smoker  Bibasilar scarring, pleural calcifications  Hospital Course   Hospital Course:    Anshul Meyer is a very pleasant and active 78 y.o. male fell off his tractor.  He had right hip pain and some difficulty ambulating.  He eventually came to the ED.  He was noted to have an acute nondisplaced intertrochanteric fracture of the proximal right femur.  He was subsequently admitted to the hospitalist service.  Orthopedic surgeon, Dr. Colindres, was consulted.  Patient subsequently had ORIF on 23.  He remained medically stable throughout his hospital stay.  He had an uneventful postop course.  He has done excellent with physical therapy requirements.  He is weightbearing as tolerated.  He is very eager to return home.  He has been discharged with home health to be arranged.  Follow-up with orthopedic surgeon 10-14 days.  Pain Rx and DVT prophylaxis with Eliquis Rx per orthopedist.    Discharge Follow Up / Recommendations (labs, diagnostics, meds, etc):   As above  Future Appointments   Date Time Provider Department Center   2023  8:30 AM Robb Rose PA [facility] ORS [facility]     Consultants     Consults       Date and Time Order Name Status Description    8/10/2023  3:31 PM IP General Consult (Use specialty-specific consult if known)      8/10/2023  3:09 PM IP General Consult (Use specialty-specific consult if known)            On Day of Discharge   VS: Temp:  [97.5 øF (36.4 øC)-99 øF (37.2 øC)] 97.8 øF (36.6 øC)  Heart Rate:  [] 85  Resp:   [16] 16  BP: (110-123)/(62-77) 121/69  EXAM:  (refer to progress note from 8/13/2023)     Discharge Medications        New Medications        Instructions Start Date   apixaban 2.5 MG tablet tablet  Commonly known as: ELIQUIS   2.5 mg, Oral, 2 Times Daily      aspirin 325 MG EC tablet  Commonly known as: Ecotrin   325 mg, Oral, Daily   Start Date: August 28, 2023     HYDROcodone-acetaminophen 7.5-325 MG per tablet  Commonly known as: Norco   1 tablet, Oral, Every 4 Hours PRN      sennosides-docusate 8.6-50 MG per tablet  Commonly known as: PERICOLACE   1 tablet, Oral, 2 Times Daily PRN             Procedures   ORIF with short gamma nail right hip fracture  Imaging     XR Chest 1 View    Result Date: 8/10/2023  PROCEDURE: XR CHEST 1 VW  COMPARISON: [facility], , CHEST PA/AP & LAT 2V, 8/03/2016, 19:05.  INDICATIONS: PRE-OP FOR HIP FRACTURE TODAY - NO CHEST COMPLAINTS  FINDINGS:  Heart size and pulmonary vessels normal.  Pleural calcifications noted on the left.  Scarring or chronic atelectasis in the lung bases.  No acute pulmonary abnormality demonstrated         1. No acute cardiopulmonary disease  2. Pleural calcifications and bibasilar scarring or atelectasis       SPEEDY LOBO MD       Electronically Signed and Approved By: SPEEDY LOBO MD on 8/10/2023 at 14:33             FL Surgery Fluoro    Result Date: 8/11/2023  This procedure was auto-finalized with no dictation required.    XR Hip With or Without Pelvis 2 - 3 View Right    Result Date: 8/10/2023  PROCEDURE: XR HIP W OR WO PELVIS 2-3 VIEW RIGHT  COMPARISON: None  INDICATIONS: fall/injury with right hip pain and inability to bear weight on right leg  FINDINGS:  There is mild narrowing of the hip joint spaces bilaterally.  There is an acute intertrochanteric nondisplaced fracture of the right proximal femur.  Sacroiliac joints appear within normal limits.  No definite pelvic fracture.        1. Acute nondisplaced intertrochanteric  fracture of the proximal right femur.      MEDHAT SCOTT MD       Electronically Signed and Approved By: MEDHAT SCOTT MD on 8/10/2023 at 13:29             Discharge Details   Hospital Diet:     Diet Order   Procedures    Diet: Regular/House Diet; Texture: Regular Texture (IDDSI 7); Fluid Consistency: Thin (IDDSI 0)     CODE STATUS:    Code Status and Medical Interventions:   Ordered at: 08/10/23 1549     Level Of Support Discussed With:    Patient     Code Status (Patient has no pulse and is not breathing):    CPR (Attempt to Resuscitate)     Medical Interventions (Patient has pulse or is breathing):    Full Support     Additional Instructions for the Follow-ups that You Need to Schedule       Discharge Follow-up with Specified Provider: Dr. Colindres ~10 days; 1 Week   As directed      To: Dr. Colindres ~10 days   Follow Up: 1 Week        Discharge Follow-up with Specified Provider: Milton BROUSSARD; 2 Weeks   As directed      To: Milton BROUSSARD   Follow Up: 2 Weeks        Discharge Follow-up with Specified Provider: Orthopedist 10-12 days   As directed      To: Orthopedist 10-12 days              Discharge Disposition: Home-Health Care [facility]  Pertinent  Labs   LAB RESULTS:      Lab 08/12/23  0410 08/11/23  0403 08/10/23  1427   WBC  --  7.63 8.50   HEMOGLOBIN 13.8 15.0 15.7   HEMATOCRIT 39.8 43.5 46.0   PLATELETS  --  222 223   NEUTROS ABS  --  4.30 5.22   IMMATURE GRANS (ABS)  --  0.03 0.03   LYMPHS ABS  --  2.17 2.19   MONOS ABS  --  0.75 0.78   EOS ABS  --  0.35 0.25   MCV  --  90.8 91.3   APTT  --   --  29.1*         Lab 08/11/23  0403 08/10/23  1427   SODIUM 136 139   POTASSIUM 4.0 4.2   CHLORIDE 102 103   CO2 25.2 27.6   ANION GAP 8.8 8.4   BUN 19 23   CREATININE 0.65* 1.00   EGFR 97.0 77.5   GLUCOSE 105* 87   CALCIUM 9.0 9.2   MAGNESIUM 2.0  --    PHOSPHORUS 3.6  --          Lab 08/11/23  0403 08/10/23  1427   TOTAL PROTEIN 6.8 7.5   ALBUMIN 3.6 4.0   GLOBULIN  --  3.5   ALT (SGPT) 10 11   AST (SGOT) 12 13    BILIRUBIN 0.5 0.4   INDIRECT BILIRUBIN 0.3  --    BILIRUBIN DIRECT 0.2  --    ALK PHOS 71 81   LIPASE  --  26                     Brief Urine Lab Results       None          Microbiology Results (last 10 days)       ** No results found for the last 240 hours. **          Labs Pending at Discharge:   Time spent on Discharge including face to face service: > 30 minutes  Electronically signed by JASMYN Fernando, 08/13/23, 2:47 PM EDT.       Attending documentation:  I reviewed the above documentation and independently reviewed and rounded and evaluated the patient and discussed the care plan with PAMELA Das PA-C, I agree with his findings and plan as documented, what I have added to the care plan and modified is as follows in my documentation and my medical decision making and discharge plan; 78-year-old male hospitalized on 8/10/2023 after suffering acute nondisplaced intertrochanteric fracture of the proximal right femur, orthopedics consulted, status post ORIF and short gamma nail repair.  Discharged in hemodynamically stable initial on 8/13/2023.  Interval follow-up: Seen and examined, no acute distress, no acute major overnight events, pain is tolerable, ambulating well with therapy.  Review of systems obtained, all systems reviewed and negative except for generalized fatigue, generalized weakness, right hip pain.  Vitals reviewed labs reviewed on physical exam elderly appearing male, sitting in the bedside chair, no acute distress, regular rate rhythm, clear to auscultation bilaterally, soft nontender abdomen, no lower extremity edema, right hip the lateral side has dressings covered with Tegaderm, no bleedthrough.  Distal extremity neurovascular intact.  Discharge plan reviewed, total discharge time 45 minutes.  Discussed with nurse at bedside.  More than 85 % of the time of this patient's encounter was performed by me, this included face-to-face time, planning and coordinating, medical decision making and  critical thinking personally done by me.    Electronically signed by Mariam Cartagena MD, 08/13/23, 2:54 PM EDT.    Portions of this documentation were transcribed electronically from a voice recognition software.  I confirm all data accurately represents the service(s) I performed at today's visit.

## 2023-08-13 NOTE — TELEPHONE ENCOUNTER
Discharge Prescriptions sent to [facility] that is closed  Requesting discharge scripts be sent to [address] [address]    Called and spoke with Gracia IBARRA on [address] [place] [address].she is aware of request.    High priority note sent to case management  Reason for Disposition   [1] Prescription not at pharmacy AND [2] was prescribed by PCP recently (Exception: Triager has access to EMR and prescription is recorded there. Go to Home Care and confirm for pharmacy.)    Additional Information   Negative: [1] Intentional drug overdose AND [2] suicidal thoughts or ideas   Negative: Drug overdose and triager unable to answer question   Negative: Caller requesting a renewal or refill of a medicine patient is currently taking   Negative: Caller requesting information unrelated to medicine   Negative: Caller requesting information about COVID-19 Vaccine   Negative: Caller requesting information about Emergency Contraception   Negative: Caller requesting information about Combined Birth Control Pills   Negative: Caller requesting information about Progestin Birth Control Pills   Negative: Caller requesting information about Post-Op pain or medicines   Negative: Caller requesting a prescription antibiotic (such as Penicillin) for Strep throat and has a positive culture result   Negative: Caller requesting a prescription anti-viral med (such as Tamiflu) and has influenza (flu) symptoms   Negative: Immunization reaction suspected   Negative: Rash while taking a medicine or within 3 days of stopping it   Negative: [1] Asthma and [2] having symptoms of asthma (cough, wheezing, etc.)   Negative: [1] Symptom of illness (e.g., headache, abdominal pain, earache, vomiting) AND [2] more than mild   Negative: Breastfeeding questions about mother's medicines and diet   Negative: MORE THAN A DOUBLE DOSE of a prescription or over-the-counter (OTC) drug   Negative: [1] DOUBLE DOSE (an extra dose or lesser amount)  "of prescription drug AND [2] any symptoms (e.g., dizziness, nausea, pain, sleepiness)   Negative: [1] DOUBLE DOSE (an extra dose or lesser amount) of over-the-counter (OTC) drug AND [2] any symptoms (e.g., dizziness, nausea, pain, sleepiness)   Negative: Took another person's prescription drug   Negative: [1] DOUBLE DOSE (an extra dose or lesser amount) of prescription drug AND [2] NO symptoms  (Exception: A double dose of antibiotics.)   Negative: Diabetes drug error or overdose (e.g., took wrong type of insulin or took extra dose)    Answer Assessment - Initial Assessment Questions  1. NAME of MEDICINE: \"What medicine(s) are you calling about?\"   All discharge medications  2. QUESTION: \"What is your question?\" (e.g., double dose of medicine, side effect)    Discharge medications sent to [facility] pharmacy that is closed.  3. PRESCRIBER: \"Who prescribed the medicine?\" Reason: if prescribed by specialist, call should be referred to that group.      Mitzi  4. SYMPTOMS: \"Do you have any symptoms?\" If Yes, ask: \"What symptoms are you having?\"  \"How bad are the symptoms (e.g., mild, moderate, severe)     no  5. PREGNANCY:  \"Is there any chance that you are pregnant?\" \"When was your last menstrual period?\"    na    Protocols used: Medication Question Call-ADULT-    "

## 2023-08-13 NOTE — PROGRESS NOTES
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 Given this finding, orthopedics was consulted, hospitalized for further monitoring and management, status post supplement joint nailing of right proximal femur fracture    Interval follow-up: 2023    POD #2  Remains medically stable   Right hip pain reasonably controlled   Doing very well with therapy   No obvious postop issues   Wants to go home this morning but will wait till after therapy session      Review of systems:  All systems reviewed and negative except for right hip pain    Objective   Objective     Vitals:   Temp:  [97.5 øF (36.4 øC)-99 øF (37.2 øC)] 97.8 øF (36.6 øC)  Heart Rate:  [] 85  Resp:  [16] 16  BP: (110-123)/(62-77) 121/69  Physical Exam               Constitutional: Awake, alert, no acute distress              Eyes: Pupils equal, sclerae anicteric, no conjunctival injection              HENT: NCAT, mucous membranes moist              Neck: Supple, full range of motion              Respiratory: Diminished to auscultation bilaterally, nonlabored respirations               Cardiovascular: RRR, no rubs, or gallops, palpable pedal pulses bilaterally              Gastrointestinal: Positive bowel sounds, soft, nontender, nondistended              Musculoskeletal: No bilateral ankle edema, right hip dressings intact.              Psychiatric: Appropriate affect, cooperative              Neurologic: Oriented x 3, strength symmetric in all extremities, speech clear              Skin: No  rashes visible on exposed skin    Result Review    Result Review:  I have personally reviewed the pertinent results from the past 24 hours to 8/13/2023 14:45 EDT and agree with these findings:  [x]  Laboratory   CBC          8/10/2023    14:27 8/11/2023    04:03 8/12/2023    04:10   CBC   WBC 8.50  7.63     RBC 5.04  4.79     Hemoglobin 15.7  15.0  13.8    Hematocrit 46.0  43.5  39.8    MCV 91.3  90.8     MCH 31.2  31.3     MCHC 34.1  34.5     RDW 12.5  12.2     Platelets 223  222       BMP          8/10/2023    14:27 8/11/2023    04:03   BMP   BUN 23  19    Creatinine 1.00  0.65    Sodium 139  136    Potassium 4.2  4.0    Chloride 103  102    CO2 27.6  25.2    Calcium 9.2  9.0      LIVER FUNCTION TESTS:      Lab 08/11/23  0403 08/10/23  1427   TOTAL PROTEIN 6.8 7.5   ALBUMIN 3.6 4.0   GLOBULIN  --  3.5   ALT (SGPT) 10 11   AST (SGOT) 12 13   BILIRUBIN 0.5 0.4   INDIRECT BILIRUBIN 0.3  --    BILIRUBIN DIRECT 0.2  --    ALK PHOS 71 81   LIPASE  --  26       [x]  Microbiology No results found for: ACANTHNAEG, AFBCX, BPERTUSSISCX, BLOODCX  No results found for: BCIDPCR, CXREFLEX, CSFCX, CULTURETIS  No results found for: CULTURES, HSVCX, URCX  No results found for: EYECULTURE, GCCX, HSVCULTURE, LABHSV  No results found for: LEGIONELLA, MRSACX, MUMPSCX, MYCOPLASCX  No results found for: NOCARDIACX, STOOLCX  No results found for: THROATCX, UNSTIMCULT, URINECX, CULTURE, VZVCULTUR  No results found for: VIRALCULTU, WOUNDCX    [x]  Radiology XR Chest 1 View    Result Date: 8/10/2023  PROCEDURE: XR CHEST 1 VW  COMPARISON: [facility], CR, CHEST PA/AP & LAT 2V, 8/03/2016, 19:05.  INDICATIONS: PRE-OP FOR HIP FRACTURE TODAY - NO CHEST COMPLAINTS  FINDINGS:  Heart size and pulmonary vessels normal.  Pleural calcifications noted on the left.  Scarring or chronic atelectasis in the lung bases.  No acute pulmonary abnormality demonstrated         1. No acute cardiopulmonary disease  2. Pleural calcifications and  bibasilar scarring or atelectasis       SPEEDY LOBO MD       Electronically Signed and Approved By: SPEEDY LOBO MD on 8/10/2023 at 14:33             FL Surgery Fluoro    Result Date: 8/11/2023  This procedure was auto-finalized with no dictation required.    XR Hip With or Without Pelvis 2 - 3 View Right    Result Date: 8/10/2023  PROCEDURE: XR HIP W OR WO PELVIS 2-3 VIEW RIGHT  COMPARISON: None  INDICATIONS: fall/injury with right hip pain and inability to bear weight on right leg  FINDINGS:  There is mild narrowing of the hip joint spaces bilaterally.  There is an acute intertrochanteric nondisplaced fracture of the right proximal femur.  Sacroiliac joints appear within normal limits.  No definite pelvic fracture.        1. Acute nondisplaced intertrochanteric fracture of the proximal right femur.      MEDHAT SCOTT MD       Electronically Signed and Approved By: MEDHAT SCOTT MD on 8/10/2023 at 13:29               []  EKG/Telemetry   []  Cardiology/Vascular   []  Pathology  [x]  Old records  []  Other:    Assessment & Plan   Assessment / Plan     Assessment:    Status post mechanical fall  Acute nondisplaced intertrochanteric fracture of the proximal right femur   Status post ORIF right femur fracture with short gamma nail 8/11/23 by Dr. Colindres  Current tobacco smoker  Bibasilar scarring, pleural calcifications     Plan:    Doing well.  Medically stable.  Home with home health.  Pain meds per Ortho  DVT prophylaxis with Eliquis, then ASA per Ortho  Follow-up orthopedic clinic 10-14 days  Weightbearing as tolerated      DVT prophylaxis:  Medical and mechanical DVT prophylaxis orders are present.    CODE STATUS:   Level Of Support Discussed With: Patient  Code Status (Patient has no pulse and is not breathing): CPR (Attempt to Resuscitate)  Medical Interventions (Patient has pulse or is breathing): Full Support         Attending documentation:  I reviewed the above documentation and independently  reviewed and rounded and evaluated the patient and discussed the care plan with PAMELA Das PA-C, I agree with his findings and plan as documented, what I have added to the care plan and modified is as follows in my documentation and my medical decision making and discharge plan; 78-year-old male hospitalized on 8/10/2023 after suffering acute nondisplaced intertrochanteric fracture of the proximal right femur, orthopedics consulted, status post ORIF and short gamma nail repair.  Discharged in hemodynamically stable initial on 8/13/2023.  Interval follow-up: Seen and examined, no acute distress, no acute major overnight events, pain is tolerable, ambulating well with therapy.  Review of systems obtained, all systems reviewed and negative except for generalized fatigue, generalized weakness, right hip pain.  Vitals reviewed labs reviewed on physical exam elderly appearing male, sitting in the bedside chair, no acute distress, regular rate rhythm, clear to auscultation bilaterally, soft nontender abdomen, no lower extremity edema, right hip the lateral side has dressings covered with Tegaderm, no bleedthrough.  Distal extremity neurovascular intact.  Discharge plan reviewed, total discharge time 45 minutes.  Discussed with nurse at bedside.  More than 85 % of the time of this patient's encounter was performed by me, this included face-to-face time, planning and coordinating, medical decision making and critical thinking personally done by me.    Electronically signed by Mariam Cartagena MD, 08/13/23, 2:53 PM EDT.    Portions of this documentation were transcribed electronically from a voice recognition software.  I confirm all data accurately represents the service(s) I performed at today's visit.

## 2023-08-13 NOTE — PLAN OF CARE
Goal Outcome Evaluation:  Patient to discharge home with family per provider order. [facility] to be contacted on 08/14/2023, MD aware and order to proceed with discharge home with family today.

## 2023-08-13 NOTE — THERAPY TREATMENT NOTE
Acute Care - Physical Therapy Progress Note   [place]     Patient Name: Anshul Meyer  : 1945  MRN: [id number]  Today's Date: 2023   Onset of Illness/Injury or Date of Surgery: 08/10/23  Visit Dx:     ICD-10-CM ICD-9-CM   1. Closed nondisplaced intertrochanteric fracture of right femur, initial encounter  S72.144A 820.21   2. Closed 2-part intertrochanteric fracture of right femur, initial encounter  S72.141A 820.21   3. Right knee pain, unspecified chronicity  M25.561 719.46   4. Skin tear of right upper arm without complication, initial encounter  S41.111A 880.03   5. Difficulty in walking  R26.2 719.7   6. Impaired mobility and ADLs  Z74.09 V49.89    Z78.9    7. Closed right hip fracture, initial encounter  S72.001A 820.8     Patient Active Problem List   Diagnosis    Closed 2-part intertrochanteric fracture of proximal end of right femur    Closed right hip fracture, initial encounter     History reviewed. No pertinent past medical history.  Past Surgical History:   Procedure Laterality Date    FEMUR IM NAILING/RODDING Right 2023    Procedure: FEMUR INTRAMEDULLARY NAILING/RODDING;  Surgeon: Elias Colindres MD;  Location: [facility];  Service: Orthopedics;  Laterality: Right;     PT Assessment (last 12 hours)       PT Evaluation and Treatment       Row Name 23 1300          Physical Therapy Time and Intention    Subjective Information complains of;pain  -CS     Document Type therapy note (daily note)  -CS     Mode of Treatment individual therapy;physical therapy  -CS     Patient Effort good  -CS     Symptoms Noted During/After Treatment none  -CS       Row Name 23 1300          Pain    Pretreatment Pain Rating 5/10  -CS     Posttreatment Pain Rating 5/10  -CS     Pain Location - Side/Orientation Right  -CS     Pain Location - hip  -CS     Pain Intervention(s) Repositioned;Cold applied;Cold pack  -CS       Row Name 23 1300          Sit-Stand Transfer    Sit-Stand  [place] (Transfers) standby assist  -     Assistive Device (Sit-Stand Transfers) walker, front-wheeled  -CS       Row Name 08/13/23 1300          Stand-Sit Transfer    Stand-Sit [place] (Transfers) standby assist  -CS     Assistive Device (Stand-Sit Transfers) walker, front-wheeled  -CS       Row Name 08/13/23 1300          Gait/Stairs (Locomotion)    [place] Level (Gait) standby assist  -CS     Assistive Device (Gait) walker, front-wheeled  -CS     Distance in Feet (Gait) 400  -     Pattern (Gait) 4-point;step-through  -CS     Deviations/Abnormal Patterns (Gait) stride length decreased;gait speed decreased  -     Bilateral Gait Deviations forward flexed posture  -     Negotiation (Stairs) --  Declined performing stair training today  -       Row Name 08/13/23 1300          Motor Skills    Therapeutic Exercise hip;knee;ankle  -       Row Name 08/13/23 1300          Hip (Therapeutic Exercise)    Hip (Therapeutic Exercise) AAROM (active assistive range of motion)  -     Hip AAROM (Therapeutic Exercise) right;flexion;extension;aBduction;aDduction;sitting;supine;10 repetitions  -       Row Name 08/13/23 1300          Knee (Therapeutic Exercise)    Knee (Therapeutic Exercise) AAROM (active assistive range of motion)  -     Knee AAROM (Therapeutic Exercise) right;flexion;extension;sitting;10 repetitions;5 repetitions  -       Row Name 08/13/23 1300          Ankle (Therapeutic Exercise)    Ankle (Therapeutic Exercise) AROM (active range of motion)  -     Ankle AROM (Therapeutic Exercise) bilateral;dorsiflexion;plantarflexion;sitting;15 repititions  -       Row Name             Wound 08/10/23 1845 Right lateral elbow Skin Tear    Wound - Properties Group Placement Date: 08/10/23  - Placement Time: 1845  -KH Present on Hospital Admission: Y  -KH Side: Right  - Orientation: lateral  - Location: elbow  - Primary Wound Type: Skin tear  -KH    Retired Wound - Properties Group  Placement Date: 08/10/23  -KH Placement Time: 1845 -KH Present on Hospital Admission: Y  -KH Side: Right  -KH Orientation: lateral  -KH Location: elbow  -KH Primary Wound Type: Skin tear  -KH    Retired Wound - Properties Group Date first assessed: 08/10/23  -KH Time first assessed: 1845 -KH Present on Hospital Admission: Y  -KH Side: Right  -KH Location: elbow  -KH Primary Wound Type: Skin tear  -KH      Row Name             Wound 08/11/23 1429 Right anterior greater trochanter Incision    Wound - Properties Group Placement Date: 08/11/23 -LD Placement Time: 1429 -LD Side: Right  -LD Orientation: anterior  -LD Location: greater trochanter  -LD Primary Wound Type: Incision  -LD    Retired Wound - Properties Group Placement Date: 08/11/23 -LD Placement Time: 1429 -LD Side: Right  -LD Orientation: anterior  -LD Location: greater trochanter  -LD Primary Wound Type: Incision  -LD    Retired Wound - Properties Group Date first assessed: 08/11/23 -LD Time first assessed: 1429  -LD Side: Right  -LD Location: greater trochanter  -LD Primary Wound Type: Incision  -LD      Row Name 08/13/23 1300          Positioning and Restraints    Pre-Treatment Position sitting in chair/recliner  -CS     Post Treatment Position chair  -CS     In Chair reclined;call light within reach;encouraged to call for assist;legs elevated;heels elevated  -       Row Name 08/13/23 1300          Progress Summary (PT)    Progress Toward Functional Goals (PT) progress toward functional goals is good  -CS               User Key  (r) = Recorded By, (t) = Taken By, (c) = Cosigned By      Initials Name Provider Type    Linette Bass, RN Registered Nurse    Marilu Ortega RN Registered Nurse    Yovanny Jorgensen PTA Physical Therapist Assistant                    Physical Therapy Education       Title: PT OT SLP Therapies (Resolved)       Topic: Physical Therapy (Resolved)       Point: Mobility training (Resolved)       Learning Progress  Summary             Patient Acceptance, E,TB, VU,NR by EV at 8/12/2023 1954    Acceptance, E, VU by AC at 8/12/2023 1041    Acceptance, E,TB, VU by DW at 8/12/2023 0930                         Point: Home exercise program (Resolved)       Learning Progress Summary             Patient Acceptance, E,TB, VU,NR by EV at 8/12/2023 1954    Acceptance, E, VU by AC at 8/12/2023 1041    Acceptance, E,TB, VU by DW at 8/12/2023 0930                         Point: Body mechanics (Resolved)       Learning Progress Summary             Patient Acceptance, E,TB, VU,NR by EV at 8/12/2023 1954    Acceptance, E, VU by AC at 8/12/2023 1041    Acceptance, E,TB, VU by DW at 8/12/2023 0930                         Point: Precautions (Resolved)       Learning Progress Summary             Patient Acceptance, E,TB, VU,NR by EV at 8/12/2023 1954    Acceptance, E, VU by AC at 8/12/2023 1041    Acceptance, E,TB, VU by DW at 8/12/2023 0930                                         User Key       Initials Effective Dates Name Provider Type Discipline    EV 06/16/21 -  Daniella Riggs, RN Registered Nurse Nurse    AC 06/16/21 -  La Ramirez OT Occupational Therapist OT     04/25/21 -  Brendan Poole, PT Physical Therapist PT                  PT Recommendation and Plan     Progress Summary (PT)  Progress Toward Functional Goals (PT): progress toward functional goals is good   Outcome Measures       Row Name 08/13/23 1300 08/12/23 0929          How much help from another person do you currently need...    Turning from your back to your side while in flat bed without using bedrails? 3  -CS 3  -DW     Moving from lying on back to sitting on the side of a flat bed without bedrails? 3  -CS 3  -DW     Moving to and from a bed to a chair (including a wheelchair)? 3  -CS 3  -DW     Standing up from a chair using your arms (e.g., wheelchair, bedside chair)? 4  -CS 3  -DW     Climbing 3-5 steps with a railing? 3  -CS 2  -DW     To walk in hospital room?  3  -CS 2  -DW     AM-PAC 6 Clicks Score (PT) 19  -CS 16  -DW        Functional Assessment    Outcome Measure Options AM-PAC 6 Clicks Basic Mobility (PT)  -CS AM-PAC 6 Clicks Basic Mobility (PT)  -DW               User Key  (r) = Recorded By, (t) = Taken By, (c) = Cosigned By      Initials Name Provider Type    DW Brendan Poole, PT Physical Therapist    CS Yovanny Dhillon PTA Physical Therapist Assistant                     Time Calculation:    PT Charges       Row Name 08/13/23 1313             Time Calculation    Start Time 0930  -CS      PT Received On 08/13/23  -CS         Timed Charges    89697 - PT Therapeutic Exercise Minutes 15  -CS      95099 - Gait Training Minutes  19  -CS      25110 - PT Therapeutic Activity Minutes 6  -CS         Total Minutes    Timed Charges Total Minutes 40  -CS       Total Minutes 40  -CS                User Key  (r) = Recorded By, (t) = Taken By, (c) = Cosigned By      Initials Name Provider Type    CS Yovanny Dhillon, ESSIE Physical Therapist Assistant                  Therapy Charges for Today       Code Description Service Date Service Provider Modifiers Qty    21875507617 HC PT THER PROC EA 15 MIN 8/13/2023 Yovanny Dhillon PTA GP 1    60978709527 HC GAIT TRAINING EA 15 MIN 8/13/2023 Yovanny Dhillon, ESSIE GP 1    04257030537 HC PT THERAPEUTIC ACT EA 15 MIN 8/13/2023 Yovanny Dhillon PTA GP 1            PT G-Codes  Outcome Measure Options: AM-PAC 6 Clicks Basic Mobility (PT)  AM-PAC 6 Clicks Score (PT): 19  AM-PAC 6 Clicks Score (OT): 21    Yovanny Dhillon PTA  8/13/2023

## 2023-08-24 ENCOUNTER — OFFICE VISIT (OUTPATIENT)
Dept: ORTHOPEDIC SURGERY | Facility: CLINIC | Age: 78
End: 2023-08-24
Payer: MEDICARE

## 2023-08-24 VITALS
HEIGHT: 75 IN | BODY MASS INDEX: 24.12 KG/M2 | SYSTOLIC BLOOD PRESSURE: 130 MMHG | WEIGHT: 194 LBS | DIASTOLIC BLOOD PRESSURE: 80 MMHG | HEART RATE: 91 BPM | OXYGEN SATURATION: 95 %

## 2023-08-24 DIAGNOSIS — M25.551 RIGHT HIP PAIN: ICD-10-CM

## 2023-08-24 DIAGNOSIS — S72.001A CLOSED RIGHT HIP FRACTURE, INITIAL ENCOUNTER: ICD-10-CM

## 2023-08-24 DIAGNOSIS — Z47.89 AFTERCARE FOLLOWING SURGERY OF THE MUSCULOSKELETAL SYSTEM: Primary | ICD-10-CM

## 2023-08-24 PROCEDURE — 1160F RVW MEDS BY RX/DR IN RCRD: CPT | Performed by: PHYSICIAN ASSISTANT

## 2023-08-24 PROCEDURE — 1159F MED LIST DOCD IN RCRD: CPT | Performed by: PHYSICIAN ASSISTANT

## 2023-08-24 PROCEDURE — 99024 POSTOP FOLLOW-UP VISIT: CPT | Performed by: PHYSICIAN ASSISTANT

## 2023-08-24 RX ORDER — HYDROCODONE BITARTRATE AND ACETAMINOPHEN 7.5; 325 MG/1; MG/1
1 TABLET ORAL EVERY 4 HOURS PRN
Qty: 36 TABLET | Refills: 0 | Status: SHIPPED | OUTPATIENT
Start: 2023-08-24

## 2023-08-24 NOTE — PROGRESS NOTES
"Chief Complaint  Pain and Follow-up of the Right Hip    Subjective          History of Present Illness      Anshul Meyer is a 78 y.o. male  presents to [facility] for     Patient presents for 2-week postop evaluation of right proximal femur cephalomedullary nailing, 8/11/2023.  Patient presents with a cane he uses it for security but states he is not needing it when he is at home.  He is doing home health physical therapy they come twice a week.  His daughter has been caring for him she is a nurse she states and he states that the incisions have been healing well denies redness swelling drainage.  He takes pain medication he is requesting refill it helps him sleep at night.  He denies calf pain he states he is doing well has no pain in the hip.  He does have some pain in his knee.      No Known Allergies     Social History     Socioeconomic History    Marital status: Single   Tobacco Use    Smoking status: Every Day     Packs/day: 1.00     Years: 70.00     Pack years: 70.00     Types: Cigarettes    Smokeless tobacco: Current   Vaping Use    Vaping Use: Never used   Substance and Sexual Activity    Alcohol use: Yes     Comment: occasionally    Drug use: Never    Sexual activity: Defer        REVIEW OF SYSTEMS    Constitutional: Denies fevers, chills, weight loss  Cardiovascular: Denies chest pain, shortness of breath  Skin: Denies rashes, acute skin changes  Neurologic: Denies headache, loss of consciousness  MSK: Right hip pain      Objective   Vital Signs:   /80   Pulse 91   Ht 190.5 cm (75\")   Wt 88 kg (194 lb)   SpO2 95%   BMI 24.25 kg/mý     Body mass index is 24.25 kg/mý.    Physical Exam    Right hip: Good healing of incisions, staples were removed and Steri-Strips were placed, no erythema, no ecchymosis or swelling, no drainage or signs of infection,.  Active flexion 120, no pain with rotation patient able hold straight leg raise, nontender calf, negative Mari " testing    Procedures    Imaging Results (Most Recent)       Procedure Component Value Units Date/Time    XR Hip With or Without Pelvis 2 - 3 View Right [[id number]] Resulted: 08/24/23 0912     Updated: 08/24/23 0913    Narrative:      View:AP/Lateral view(s)  Site: Right hip  Indication: Right hip pain  Study: X-rays ordered, taken in the office, and reviewed today  X-ray: Good healing of right proximal femur fracture with intact brittany and   screws, no signs of hardware failure or loosening, no subsidence or   periprosthetic fracture, good alignment  Comparative data: Compared to intraoperative studies               Result Review :   The following data was reviewed by: JASMYN Aleman on 08/24/2023:  Data reviewed : Radiologic studies reviewed by me with the patient              Assessment and Plan    Diagnoses and all orders for this visit:    1. Aftercare following surgery of right proximal femur fracture nailing 8/11/23 (Primary)    2. Closed right hip fracture, initial encounter    3. Right hip pain  -     XR Hip With or Without Pelvis 2 - 3 View Right        Reviewed x-rays with the patient discussed diagnosis and treatment options he will continue home health physical therapy, continue weightbearing and activity as tolerated he was given pain medication refill, continue cane use in public, follow-up in 2 weeks for recheck.  X-rays at next visit.  Patient wants to follow-up in 2 weeks because on September 12 he will be leaving for [place] for about 9 months.    Call or return if worsening symptoms.    Follow Up   Return in about 2 weeks (around 9/7/2023).  Patient was given instructions and counseling regarding his condition or for health maintenance advice. Please see specific information pulled into the AVS if appropriate.

## 2023-09-08 ENCOUNTER — OFFICE VISIT (OUTPATIENT)
Dept: ORTHOPEDIC SURGERY | Facility: CLINIC | Age: 78
End: 2023-09-08
Payer: MEDICARE

## 2023-09-08 VITALS
HEART RATE: 81 BPM | OXYGEN SATURATION: 94 % | BODY MASS INDEX: 24.12 KG/M2 | DIASTOLIC BLOOD PRESSURE: 80 MMHG | SYSTOLIC BLOOD PRESSURE: 132 MMHG | WEIGHT: 194 LBS | HEIGHT: 75 IN

## 2023-09-08 DIAGNOSIS — Z47.89 AFTERCARE FOLLOWING SURGERY OF THE MUSCULOSKELETAL SYSTEM: Primary | ICD-10-CM

## 2023-09-08 DIAGNOSIS — M25.551 RIGHT HIP PAIN: ICD-10-CM

## 2023-09-08 RX ORDER — SENNOSIDES 8.6 MG
CAPSULE ORAL
COMMUNITY
Start: 2023-08-13 | End: 2023-09-11

## 2023-09-08 NOTE — PROGRESS NOTES
"Chief Complaint  Pain and Follow-up of the Right Hip    Subjective          History of Present Illness      Anshul Meyer is a 78 y.o. male  presents to [facility] for     Patient presents for follow-up evaluation of right proximal femur nailing, 8/11/2023.  Patient is here today because he is leaving for [place] for 9 months and this would be his final evaluation that we will have with him prior to his departure to [place].  Patient denies pain, denies difficulty with range of motion he states in the morning he may be a little stiff but he denies pain and is ambulating well without a walker or cane.  Patient feels confident that he is recovered enough to safely go to [place].  He is happy with his progress, no new complaints      No Known Allergies     Social History     Socioeconomic History    Marital status: Single   Tobacco Use    Smoking status: Every Day     Packs/day: 1.00     Years: 70.00     Pack years: 70.00     Types: Cigarettes    Smokeless tobacco: Current   Vaping Use    Vaping Use: Never used   Substance and Sexual Activity    Alcohol use: Yes     Comment: occasionally    Drug use: Never    Sexual activity: Defer        REVIEW OF SYSTEMS    Constitutional: Denies fevers, chills, weight loss  Cardiovascular: Denies chest pain, shortness of breath  Skin: Denies rashes, acute skin changes  Neurologic: Denies headache, loss of consciousness  MSK: Right hip pain      Objective   Vital Signs:   /80   Pulse 81   Ht 190.5 cm (75\")   Wt 88 kg (194 lb 0.1 oz)   SpO2 94%   BMI 24.25 kg/m²     Body mass index is 24.25 kg/m².    Physical Exam         Right hip: Well-healed incisions, no erythema, no ecchymosis or swelling, nontender to palpation, no pain with range of motion, active flexion 120, no pain with rotation, 5 out of 5 strength, patient ambulates with nonantalgic gait, leg lengths are equal, nontender calf, negative Mari testing        Procedures    Imaging " Results (Most Recent)       Procedure Component Value Units Date/Time    XR Hip With or Without Pelvis 2 - 3 View Right [[id number]] Resulted: 09/08/23 0801     Updated: 09/08/23 0801    Narrative:      View:AP/Lateral view(s)  Site: Right hip  Indication: Right hip pain  Study: X-rays ordered, taken in the office, and reviewed today  X-ray: Good healing of proximal femur fracture with intact brittany and screws,   no signs of hardware failure or loosening, no subsidence or periprosthetic   fracture  Comparative data: Compared to previous studies             Result Review :   The following data was reviewed by: JASMYN Aleman on 09/08/2023:  Data reviewed : Radiologic studies reviewed by me with the patient              Assessment and Plan    Diagnoses and all orders for this visit:    1. Aftercare following surgery of right proximal femur fracture nailing 8/11/23 (Primary)    2. Right hip pain  -     XR Hip With or Without Pelvis 2 - 3 View Right        Reviewed x-rays with the patient discussed diagnosis and treatment options, due to his excellent progress he was advised he can continue activity and weightbearing as tolerated he is moving to [place] for 9 months he was advised if any new or concerning symptoms occur to seek care with orthopedic physicians in [place] or call our office as needed.  Patient agreed he will follow-up as needed.    Call or return if worsening symptoms.    Follow Up   Return if symptoms worsen or fail to improve.  Patient was given instructions and counseling regarding his condition or for health maintenance advice. Please see specific information pulled into the AVS if appropriate.

## 2024-08-16 NOTE — INTERVAL H&P NOTE
GENERAL PRE-PROCEDURE:   Procedure:  MIGDALIA  Date/Time:  8/16/2024 12:01 PM    Verbal consent obtained?: Yes    Written consent obtained?: Yes    Risks and benefits: Risks, benefits and alternatives were discussed    Consent given by:  Patient  Patient states understanding of procedure being performed: Yes    Patient's understanding of procedure matches consent: Yes    Procedure consent matches procedure scheduled: Yes    Expected level of sedation:  Moderate  Appropriately NPO:  Yes  Mallampati  :  Grade 3- soft palate visible, posterior pharyngeal wall not visible  Lungs:  Lungs clear with good breath sounds bilaterally  Heart:  Systolic murmur  History & Physical reviewed:  History and physical reviewed and no updates needed  Statement of review:  I have reviewed the lab findings, diagnostic data, medications, and the plan for sedation     H&P reviewed.  The patient was examined and there are no changes to the H&P

## (undated) DEVICE — SUT VIC 0 CT1 36IN J946H

## (undated) DEVICE — KENDALL SCD EXPRESS SLEEVES, KNEE LENGTH, MEDIUM: Brand: KENDALL SCD

## (undated) DEVICE — STERILE POLYISOPRENE POWDER-FREE SURGICAL GLOVES WITH EMOLLIENT COATING: Brand: PROTEXIS

## (undated) DEVICE — KT PT POSITION SUPINE HANA/PROFX TABL

## (undated) DEVICE — GW TEAR DROP NAT 3X100CM

## (undated) DEVICE — GLV SURG SENSICARE PI ORTHO SZ8 LF STRL

## (undated) DEVICE — GUIDEPIN TEMP THRD 3.2X508MM DISP

## (undated) DEVICE — PAD GRND REM POLYHESIVE A/ DISP

## (undated) DEVICE — GOWN,REINFORCE,POLY,SIRUS,BREATH SLV,XLG: Brand: MEDLINE

## (undated) DEVICE — MINOR-LF: Brand: MEDLINE INDUSTRIES, INC.

## (undated) DEVICE — BLANKT WARM PACU MISTRAL/AIR PREM REFL A/ 85.8X50IN

## (undated) DEVICE — DRSNG SURESITE WNDW 4X4.5

## (undated) DEVICE — STERILE POLYISOPRENE POWDER-FREE SURGICAL GLOVES: Brand: PROTEXIS

## (undated) DEVICE — 3M™ STERI-DRAPE™ X-RAY IMAGE INTENSIFIER DRAPE, 10 PER CARTON / 4 CARTONS PER CASE, 1013: Brand: STERI-DRAPE™

## (undated) DEVICE — PROXIMATE RH ROTATING HEAD SKIN STAPLERS (35 WIDE) CONTAINS 35 STAINLESS STEEL STAPLES: Brand: PROXIMATE

## (undated) DEVICE — ANTIBACTERIAL VIOLET BRAIDED (POLYGLACTIN 910), SYNTHETIC ABSORBABLE SURGICAL SUTURE: Brand: COATED VICRYL

## (undated) DEVICE — PENCL E/S SMOKEEVAC W/TELESCP CANN

## (undated) DEVICE — DRSNG GZ PETROLTM XEROFORM CURAD 1X8IN STRL

## (undated) DEVICE — MAT FLR ABS W/BLU/LINER 56X72IN WHT

## (undated) DEVICE — 6617 IOBAN II PATIENT ISOLATION DRAPE 5/BX,4BX/CS: Brand: STERI-DRAPE™ IOBAN™ 2

## (undated) DEVICE — INTENDED FOR TISSUE SEPARATION, AND OTHER PROCEDURES THAT REQUIRE A SHARP SURGICAL BLADE TO PUNCTURE OR CUT.: Brand: BARD-PARKER ® CARBON RIB-BACK BLADES

## (undated) DEVICE — SPNG CVR STR 2S 4X4

## (undated) DEVICE — 3M™ IOBAN™ 2 ANTIMICROBIAL INCISE DRAPE 6650EZ: Brand: IOBAN™ 2

## (undated) DEVICE — APPL CHLORAPREP HI/LITE 26ML ORNG

## (undated) DEVICE — UNDYED BRAIDED (POLYGLACTIN 910), SYNTHETIC ABSORBABLE SUTURE: Brand: COATED VICRYL

## (undated) DEVICE — Device

## (undated) DEVICE — DRP SURG U/DRP INVISISHIELD PA 48X52IN